# Patient Record
Sex: FEMALE | Race: ASIAN | NOT HISPANIC OR LATINO
[De-identification: names, ages, dates, MRNs, and addresses within clinical notes are randomized per-mention and may not be internally consistent; named-entity substitution may affect disease eponyms.]

---

## 2020-09-08 PROBLEM — Z00.00 ENCOUNTER FOR PREVENTIVE HEALTH EXAMINATION: Status: ACTIVE | Noted: 2020-09-08

## 2020-10-10 DIAGNOSIS — Z01.818 ENCOUNTER FOR OTHER PREPROCEDURAL EXAMINATION: ICD-10-CM

## 2020-10-12 ENCOUNTER — APPOINTMENT (OUTPATIENT)
Dept: DISASTER EMERGENCY | Facility: CLINIC | Age: 65
End: 2020-10-12

## 2020-10-15 ENCOUNTER — APPOINTMENT (OUTPATIENT)
Dept: PULMONOLOGY | Facility: CLINIC | Age: 65
End: 2020-10-15
Payer: MEDICARE

## 2020-10-15 VITALS
SYSTOLIC BLOOD PRESSURE: 148 MMHG | TEMPERATURE: 97.6 F | HEIGHT: 55 IN | WEIGHT: 125 LBS | DIASTOLIC BLOOD PRESSURE: 89 MMHG | OXYGEN SATURATION: 97 % | HEART RATE: 77 BPM | BODY MASS INDEX: 28.93 KG/M2

## 2020-10-15 DIAGNOSIS — Z82.49 FAMILY HISTORY OF ISCHEMIC HEART DISEASE AND OTHER DISEASES OF THE CIRCULATORY SYSTEM: ICD-10-CM

## 2020-10-15 DIAGNOSIS — E78.5 HYPERLIPIDEMIA, UNSPECIFIED: ICD-10-CM

## 2020-10-15 DIAGNOSIS — Z23 ENCOUNTER FOR IMMUNIZATION: ICD-10-CM

## 2020-10-15 DIAGNOSIS — Z82.5 FAMILY HISTORY OF ASTHMA AND OTHER CHRONIC LOWER RESPIRATORY DISEASES: ICD-10-CM

## 2020-10-15 DIAGNOSIS — R73.03 PREDIABETES.: ICD-10-CM

## 2020-10-15 PROCEDURE — G0008: CPT

## 2020-10-15 PROCEDURE — ZZZZZ: CPT

## 2020-10-15 PROCEDURE — 94726 PLETHYSMOGRAPHY LUNG VOLUMES: CPT

## 2020-10-15 PROCEDURE — 90662 IIV NO PRSV INCREASED AG IM: CPT

## 2020-10-15 PROCEDURE — 99204 OFFICE O/P NEW MOD 45 MIN: CPT | Mod: 25

## 2020-10-15 PROCEDURE — 94060 EVALUATION OF WHEEZING: CPT

## 2020-10-15 PROCEDURE — 94729 DIFFUSING CAPACITY: CPT

## 2020-10-15 RX ORDER — ASPIRIN 81 MG/1
81 TABLET, CHEWABLE ORAL
Refills: 0 | Status: ACTIVE | COMMUNITY

## 2020-10-15 RX ORDER — MULTIVITAMIN
TABLET ORAL
Refills: 0 | Status: ACTIVE | COMMUNITY

## 2020-10-15 NOTE — HISTORY OF PRESENT ILLNESS
[TextBox_4] : Ms. Dillard is a 64-year-old female with a history of prediabetes and hyperlipidemia who presents for evaluation of cough and wheezing. Symptoms have been present for several months. She develops dyspnea with exertion with wheezing. She had a cough from July to September, but now cough is resolved. She was started on Symbicort in September, she takes 1 puff twice daily. She also has a ventolin inhaler, which she takes as necessary with reported relief. No postnasal drip or allergies. Reports occasional acid reflux or heartburn, but not regularly. She takes a walk regularly. Exercise tolerance is more than 10 blocks and can walk up several flights of stairs at a normal pace.\par \par Labs in Sept 2020 with peripheral eosinophilia (absolute 435, 6.8%). Also with hyperlipidemia.\par \par CXR PA/Lateral (Sept 2020) with clear lungs, no pneumonia or pleural effusion. No pneumothorax. Cardiac silhouette appears normal. No significant change compared to previous CXR in January 2017.\par \par 2D-echo (June 2020) with normal LV size and systolic function. Mild diastolic dysfunction. Normal RV size and systolic function. LA/RA noraml in size. Minimal MR. Mild TR. No pericardial effusion. Normal pulmonary artery, IVC collapsible\par \par Stress echocardiogram (June 2020) normal study with no evidence of inducible ischemia.\par \par

## 2020-10-15 NOTE — CONSULT LETTER
[Dear  ___] : Dear  [unfilled], [Consult Letter:] : I had the pleasure of evaluating your patient, [unfilled]. [Please see my note below.] : Please see my note below. [Consult Closing:] : Thank you very much for allowing me to participate in the care of this patient.  If you have any questions, please do not hesitate to contact me. [Sincerely,] : Sincerely, [FreeTextEntry2] : Dr. Jensen Nicole MD\par [phone]\par \par Dr. Johnathan Holley MD\par Fax: [phone] [FreeTextEntry3] : William Roque MD\par Attending Physician in Pulmonary & Critical Care Medicine\par  of Medicine\par [employer] [facility]

## 2020-10-15 NOTE — PHYSICAL EXAM
[No Acute Distress] : no acute distress [Well Nourished] : well nourished [Well Groomed] : well groomed [Well Developed] : well developed [Normal Oropharynx] : normal oropharynx [Normal Appearance] : normal appearance [No Neck Mass] : no neck mass [Normal Rate/Rhythm] : normal rate/rhythm [Normal S1, S2] : normal s1, s2 [No Murmurs] : no murmurs [No Resp Distress] : no resp distress [Clear to Auscultation Bilaterally] : clear to auscultation bilaterally [No Abnormalities] : no abnormalities [Benign] : benign [Normal Gait] : normal gait [No Clubbing] : no clubbing [No Cyanosis] : no cyanosis [No Edema] : no edema [FROM] : FROM [Normal Color/ Pigmentation] : normal color/ pigmentation [No Focal Deficits] : no focal deficits [Oriented x3] : oriented x3 [Normal Affect] : normal affect

## 2020-10-15 NOTE — ASSESSMENT
[FreeTextEntry1] : Ms. Dillard is a 64-year-old female with a history of prediabetes and hyperlipidemia who presents for evaluation of cough and wheezing. Symptoms have been present for several months. She develops dyspnea with exertion with wheezing. She had a cough from July to September, but now cough is resolved after starting Symbicort. No associated symptoms of postnasal drip, chest tightness, chest pain, acid reflux, or significant heartburn. Exercise tolerance is not reduced. Of note, labs in Sept 2020 with peripheral eosinophilia (absolute 435, 6.8%)\par \par 1. Dyspnea on exertion:\par - Likely due to mild asthma, possible exercise-induced component\par - PFTs today with mild restriction, low lung volumes, and mildly reduced diffusing capacity\par - Lung are clear to auscultation without crackles or wheezing\par - 2D-echo (June 2020) with normal LV size and systolic function, mild diastolic dysfunction, normal RV size and systolic function, LA/RA normal size, minimal MR, mild TR, no pericardial effusion, normal PA, IVC collapsible\par - Stress echocardiogram (June 2020) normal study with no inducible ischemic changes\par - CXR in September 2020 with clear lungs, she will bring in CD of images next visit\par \par 2. Likely mild persistent asthma:\par - Possible exercise-induced component\par - Increase Symbicort to 80-4.5 mcg 2 puffs twice daily with spacer, rinse after use\par - Ventolin inhaler prn\par - Repeat spirometry with BD testing in December to assess for improvement in FEV1 & FVC\par - If symptoms persist, consider checking IgE, serum allergy testing, and repeat CBC with diff\par \par 3. HCM:\par - Influenza vaccine administered today\par - Will need Prevnar 13 next visit followed by [id number] in 1 year\par - Follow-up in 2 months or sooner prn\par Stress echocardiogram (June 2020) normal study with no evidence of inducible ischemia.\par \par cd of cxr\par

## 2021-04-19 ENCOUNTER — APPOINTMENT (OUTPATIENT)
Dept: PULMONOLOGY | Facility: CLINIC | Age: 66
End: 2021-04-19
Payer: MEDICARE

## 2021-04-19 VITALS
HEART RATE: 71 BPM | BODY MASS INDEX: 29.02 KG/M2 | WEIGHT: 125.38 LBS | DIASTOLIC BLOOD PRESSURE: 82 MMHG | HEIGHT: 55 IN | TEMPERATURE: 97.5 F | SYSTOLIC BLOOD PRESSURE: 121 MMHG | OXYGEN SATURATION: 99 % | RESPIRATION RATE: 15 BRPM

## 2021-04-19 DIAGNOSIS — R06.83 SNORING: ICD-10-CM

## 2021-04-19 PROCEDURE — 99204 OFFICE O/P NEW MOD 45 MIN: CPT | Mod: GC

## 2021-04-19 PROCEDURE — 99072 ADDL SUPL MATRL&STAF TM PHE: CPT

## 2021-04-19 NOTE — HISTORY OF PRESENT ILLNESS
[FreeTextEntry1] : 65F hx pre-DM, HLD, who comes for initial evaluation. Pt was previously seen by Dr. Roque in 10/2020 for  cough, wheezing, FARFAN. Started Symbicort and Ventolin with relief. ETT > 10 blocks though at a slower pace than others and can walk up several flights of stairs at a normal pace. Of note, in 9/2020, she had peripheral eosinophilia. CXR showed clear lungs and Echo in 6/2020 showed mild diastolic dysfunction but otherwise normal LV size/sys function. PFTs with mild restriction pattern.\par \par Today she c/o wheezing, especially at night. States that wheezing has been occurring for the past year and is precipitated by exertion, strong smells. Albuterol helps with wheezing. Sometimes she has wheezing at nighttime that is not precipitated by anything. No coughing, SOB, chest pain, heart palpitations. Remains a stable weight. Currently on symbicort 80mcg bid, which she is mostly compliant with. No history of steroid use. Symptoms have remained persistent and about the same since they started. No hx of asthma as a child, never smoked, no second hand smoking and no wood burning stoves. Her  has also told her that she snores and has witnessed apneic episodes. Has received COVID vaccine. \par \par Meds: Amlodipine 2.5mg, atorvastatin 20 mg \par \par Sleep schedule: 11PM-12AM to bed, sleep latency 30 min, 2 nighttime awakenings - nocturia, no nocturnal choking/gasping, wakes up at 7-7:30AM, refreshed; does not require naps; feels sleepy after lunch but otherwise feels awake/alert during the day; +AM dry mouth, no AM headaches\par  \par ESS: 7\par \par PFTs 10/15/2020: FEV1/FVC 76%, FEV1 65%, FVC 66%, TLC 67%, DLCO 63%\par  [Lower Extremity Discomfort] : no lower extremity discomfort in evening or at bedtime

## 2021-04-19 NOTE — PHYSICAL EXAM
[General Appearance - Well Developed] : well developed [General Appearance - Well Nourished] : well nourished [Low Lying Soft Palate] : low lying soft palate [Enlarged Base of the Tongue] : enlargement of the base of the tongue [IV] : IV [Neck Appearance] : the appearance of the neck was normal [Heart Rate And Rhythm] : heart rate was normal and rhythm regular [Heart Sounds] : normal S1 and S2 [] : no respiratory distress [Auscultation Breath Sounds / Voice Sounds] : lungs were clear to auscultation bilaterally [Abnormal Walk] : normal gait [Motor Tone] : muscle strength and tone were normal [Nail Clubbing] : no clubbing of the fingernails [Cyanosis, Localized] : no localized cyanosis [Skin Color & Pigmentation] : normal skin color and pigmentation [Skin Lesions] : no skin lesions [Motor Exam] : the motor exam was normal [No Focal Deficits] : no focal deficits [Oriented To Time, Place, And Person] : oriented to person, place, and time [Mood] : the mood was normal

## 2021-04-19 NOTE — ASSESSMENT
[FreeTextEntry1] : 65F hx pre-DM, HLD, who comes for initial evaluation. Pt was previously seen by Dr. Roque in 10/2020 for FARFAN, coughing and wheezing. Had PFTs, which showed mild restrictive disease with reduced ERV. Pt no longer having coughing and FARFAN but still has wheezing. Using symbicort and albuterol prn, which has been helpful. Pt likely has small airways disease. Though pt has no EDS (ESS 7),  states that she snores and has witnessed apneic episodes to evaluate for sleep disordered breathing. Will obtain HSAT to r/o sleep apnea.\par \par The patient was referred to the [facility] for diagnostic HSAT for further assessment. The ramifications of obstructive sleep apnea and its potential therapeutic modalities were discussed with the patient. The dangers of drowsy driving were discussed with the patient. The patient was warned to avoid drowsy driving. The patient will followup after results of this study are available.\par

## 2021-04-19 NOTE — REVIEW OF SYSTEMS
July 1, 2019       Viridiana Scott MD  [address] [place] [address]  VIA Facsimile: [phone]      Patient: Patricia Landeros   YOB: 1973   Date of Visit: 7/1/2019       Dear Dr. Scott:    I saw your patient, Patricia Landeros, for an evaluation. Below are my notes for this visit with her.    If you have questions, please do not hesitate to call me.      Sincerely,        Vitor Orourke MD        CC: No Recipients  Vitor Orourke MD  7/1/2019  9:03 AM  Sign at close encounter    HISTORY OF PRESENT ILLNESS:  Patricia Landeros is a 46 year old female whom returns regarding Medial Meniscus Tear; RIGHT Knee and Patellofemoral Osteoarthritis; RIGHT Knee  - pt was told surgery is the next step. Pain is better and swelling is better but still having mechanical issues. Pain is medial. Swelling and pain are stopping her from ADLs and exercise.     Also, s/p Open Patellar Realignment LEFT Knee 1992/93. Patellofemoral Osteoarthritis as well. She is compensating and needs me to look at the LEFT Knee as well. No recent injury.     Accompanied by No One    Occupation/School: Desk Work  PT: Ted Escobar ([employer])    Review of Systems   Constitutional: Negative for chills and fever.   HENT: Negative for congestion, sore throat and tinnitus.    Eyes: Negative for redness.   Respiratory: Negative for cough, shortness of breath, wheezing and stridor.    Cardiovascular: Negative for leg swelling.   Gastrointestinal: Negative for diarrhea, nausea and vomiting.   Endocrine: Negative for cold intolerance and heat intolerance.   Musculoskeletal: Negative for arthralgias, joint swelling and myalgias.   Skin: Negative for color change and pallor.   Neurological: Negative for syncope, weakness and numbness.   Hematological: Does not bruise/bleed easily.   Psychiatric/Behavioral: Negative for agitation, confusion and self-injury.       Past Medial History: Please see intake form    Past Surgical History: Please see intake  form    Family History: Please see intake form    Social History: Please see intact form     MEDICATIONS:  No current outpatient medications on file.     No current facility-administered medications for this visit.         ALLERGIES:  Allergies not on file    PHYSICAL EXAMINATION:    RIGHT Knee:  2+ Effusion. No atrophy. POSITIVE TTP Medial Joint Line. No TTP Lateral Joint Line. No TTP Patella. No TTP Patellar Tendon. No Para-patellar TTP. No Pes Anserine Bursa TTP. No Popliteal Fossa TTP. AROM: 0 - 125. Negative Lachman. Negative Seated Anterior Drawer. Negative Posterior Drawer. Positive Laci's Medial. Negative Laci's Lateral. No Varus Pain or Laxity in Flexion or Extension. No Valgus Pain or Laxity in Flexion or Extension. Calf Soft, NT. NVI distally.    LEFT Knee: Well healed mid-line scar incision. Trace Effusion. No atrophy. Mild TTP Medial Joint Line. No TTP Lateral Joint Line. No TTP Patella. No TTP Patellar Tendon. No Para-patellar TTP. Positive Pes Anserine Bursa TTP. No Popliteal Fossa TTP. AROM: 0 - 120. Negative Lachman. Negative Seated Anterior Drawer. Negative Posterior Drawer. Negative Laci's Medial. Negative Laci's Lateral. No Varus Pain or Laxity in Flexion or Extension. No Valgus Pain or Laxity in Flexion or Extension. Calf Soft, NT. NVI distally.    IMAGING & TESTING:   MRI RIGHT Knee (12/23/18 - REPORT ONLY): Large Radial Tear Posterior Horn Medial Meniscus. Moderate to advanced osteoarthritis... Worse than Patellofemoral compartment. Large Effusion.      Bilateral PA Standing, Bilateral Patellar [place] and Lateral of the LEFT Knee (1/23/17): Normal alignment of the tibiofemoral with marginal osteophyte and degenerative change. Patellofemoral joint laterally tilted with advanced degenerative change, especially lateral facet LEFT > RIGHT. No fracture or dislocation. No evidence of loose body.    ASSESSMENT & PLAN:  Patricia Landeros is a 46 year old female   1) Medial Meniscus Tear;  RIGHT Knee and Patellofemoral Osteoarthritis; RIGHT Knee .   2) Patellofemoral osteoarthritis; LEFT Knee    Plan as follows:  1. Reviewed the Radiographs and MRI findings.  2. LEFT Knee is likely due to compensation for RIGHT  3. RIGHT Knee has persistent mechanical symptoms. Surgery is the next logical step  4. Reviewed the diagnosis and treatment options at this point. Surgery is the next step to consider. Surgery was explained in detail, the procedure, the risks and benefits and the post-operative rehab and recovery. All questions were answered. When ready, the patient will contact my Surgical Scheduler to arrange for RIGHT Knee Arthroscopic Partial Medial Meniscectomy  5. She will schedule surgery as soon as able.      Vitor Orourke MD  06/26/19              [Snoring] : snoring [Witnessed Apneas] : witnessed apnea [Negative] : Psychiatric [EDS: ESS=____] : no daytime somnolence [Fatigue] : no fatigue [Nasal Congestion] : no nasal congestion [Shortness Of Breath] : no shortness of breath [Difficulty Initiating Sleep] : no difficulty falling asleep [Difficulty Maintaining Sleep] : no difficulty maintaining sleep [Lower Extremity Discomfort] : no lower extremity discomfort [Late day/ Evening symptoms] : no late day/evening symptoms [Unusual Sleep Behavior] : no unusual sleep behavior [Hypersomnolence] : not sleeping much more than usual [Cataplexy] :  no cataplexy [Sleep Paralysis] : no sleep paralysis [FreeTextEntry8] : wheezing

## 2021-05-20 ENCOUNTER — APPOINTMENT (OUTPATIENT)
Dept: SLEEP CENTER | Facility: CLINIC | Age: 66
End: 2021-05-20
Payer: MEDICARE

## 2021-05-20 ENCOUNTER — OUTPATIENT (OUTPATIENT)
Dept: OUTPATIENT SERVICES | Facility: HOSPITAL | Age: 66
LOS: 1 days | End: 2021-05-20
Payer: MEDICARE

## 2021-05-20 PROCEDURE — 95806 SLEEP STUDY UNATT&RESP EFFT: CPT | Mod: 26

## 2021-05-20 PROCEDURE — 95806 SLEEP STUDY UNATT&RESP EFFT: CPT

## 2021-05-26 DIAGNOSIS — G47.33 OBSTRUCTIVE SLEEP APNEA (ADULT) (PEDIATRIC): ICD-10-CM

## 2021-06-03 ENCOUNTER — NON-APPOINTMENT (OUTPATIENT)
Age: 66
End: 2021-06-03

## 2021-06-17 ENCOUNTER — NON-APPOINTMENT (OUTPATIENT)
Age: 66
End: 2021-06-17

## 2022-03-23 ENCOUNTER — LABORATORY RESULT (OUTPATIENT)
Age: 67
End: 2022-03-23

## 2022-03-23 ENCOUNTER — APPOINTMENT (OUTPATIENT)
Dept: PULMONOLOGY | Facility: CLINIC | Age: 67
End: 2022-03-23
Payer: MEDICARE

## 2022-03-23 VITALS
OXYGEN SATURATION: 97 % | HEART RATE: 84 BPM | WEIGHT: 134 LBS | BODY MASS INDEX: 31.01 KG/M2 | TEMPERATURE: 98.2 F | RESPIRATION RATE: 16 BRPM | DIASTOLIC BLOOD PRESSURE: 98 MMHG | SYSTOLIC BLOOD PRESSURE: 162 MMHG | HEIGHT: 55 IN

## 2022-03-23 DIAGNOSIS — J06.9 ACUTE UPPER RESPIRATORY INFECTION, UNSPECIFIED: ICD-10-CM

## 2022-03-23 PROCEDURE — 99215 OFFICE O/P EST HI 40 MIN: CPT

## 2022-03-23 RX ORDER — BUDESONIDE AND FORMOTEROL FUMARATE DIHYDRATE 80; 4.5 UG/1; UG/1
80-4.5 AEROSOL RESPIRATORY (INHALATION) TWICE DAILY
Qty: 1 | Refills: 5 | Status: DISCONTINUED | COMMUNITY
Start: 1900-01-01 | End: 2022-03-23

## 2022-03-24 PROBLEM — J06.9 ACUTE URI: Status: RESOLVED | Noted: 2022-03-24 | Resolved: 2022-04-23

## 2022-03-24 LAB
25(OH)D3 SERPL-MCNC: 9.6 NG/ML
ALBUMIN SERPL ELPH-MCNC: 4.7 G/DL
ALP BLD-CCNC: 95 U/L
ALT SERPL-CCNC: 19 U/L
ANION GAP SERPL CALC-SCNC: 14 MMOL/L
AST SERPL-CCNC: 22 U/L
BASOPHILS # BLD AUTO: 0.07 K/UL
BASOPHILS NFR BLD AUTO: 0.3 %
BILIRUB SERPL-MCNC: 0.3 MG/DL
BUN SERPL-MCNC: 9 MG/DL
CALCIUM SERPL-MCNC: 10.1 MG/DL
CHLORIDE SERPL-SCNC: 101 MMOL/L
CO2 SERPL-SCNC: 25 MMOL/L
CREAT SERPL-MCNC: 0.62 MG/DL
EGFR: 97 ML/MIN/1.73M2
EOSINOPHIL # BLD AUTO: 0.2 K/UL
EOSINOPHIL NFR BLD AUTO: 0.9 %
GLUCOSE SERPL-MCNC: 80 MG/DL
HCT VFR BLD CALC: 43.8 %
HGB BLD-MCNC: 13.3 G/DL
IMM GRANULOCYTES NFR BLD AUTO: 0.6 %
LYMPHOCYTES # BLD AUTO: 2.42 K/UL
LYMPHOCYTES NFR BLD AUTO: 10.8 %
MAN DIFF?: NORMAL
MCHC RBC-ENTMCNC: 27.3 PG
MCHC RBC-ENTMCNC: 30.4 GM/DL
MCV RBC AUTO: 89.8 FL
MONOCYTES # BLD AUTO: 1.3 K/UL
MONOCYTES NFR BLD AUTO: 5.8 %
NEUTROPHILS # BLD AUTO: 18.34 K/UL
NEUTROPHILS NFR BLD AUTO: 81.6 %
PLATELET # BLD AUTO: 318 K/UL
POTASSIUM SERPL-SCNC: 4.6 MMOL/L
PROT SERPL-MCNC: 7.5 G/DL
RBC # BLD: 4.88 M/UL
RBC # FLD: 14.6 %
SODIUM SERPL-SCNC: 140 MMOL/L
WBC # FLD AUTO: 22.47 K/UL

## 2022-03-24 NOTE — HISTORY OF PRESENT ILLNESS
[TextBox_4] : Ms. Dillard is a 66-year-old female with a history of asthma, mild GEORGINA (not on therapy), prediabetes and hyperlipidemia who presents with worsening cough and dyspnea for the past 2-3 weeks. She takes Symbicort 80-4.5 mcg 2 puffs twice daily. She has been requiring to take albuterol. She just returned from [place] last week. Reports occasional wheezing. Reports recent 5-10 lb weight gain. She has a cough productive of white phlegm for the past week, now improved with Robitussin, but still present. No fevers or chills. No chest pain. Develops dyspnea going up a flight of stairs. Asthma appears to be triggered by exercise. No postnasal drip or allergies. Reports occasional acid reflux or heartburn, but not regularly. She used to take walks regularly. Exercise tolerance is more than 10 blocks and can walk up several flights of stairs at a normal pace. Labs in Sept 2020 with peripheral eosinophilia (absolute 435, 6.8%). \par \par Regarding her GEORGINA, recent HSAT in May 2021 with NOELLE 11.4, T90 0.7%, consistent with mild GEORGINA. Given ESS<10 with no excessive daytime sleepiness, she does not require therapy. She was referred to dentistry for OAT given her bothersome snoring.\par \par PFTs (Oct 2020) with mild restriction, low lung volumes, and mildly reduced diffusing capacity. Pending repeat PFTs with BD testing\par \par CXR PA/Lateral (Sept 2020) with clear lungs, no pneumonia or pleural effusion. No pneumothorax. Cardiac silhouette appears normal. No significant change compared to previous CXR in January 2017.\par \par 2D-echo (June 2020) with normal LV size and systolic function. Mild diastolic dysfunction. Normal RV size and systolic function. LA/RA noraml in size. Minimal MR. Mild TR. No pericardial effusion. Normal pulmonary artery, IVC collapsible\par \par Stress echocardiogram (June 2020) normal study with no evidence of inducible ischemia.

## 2022-03-24 NOTE — PHYSICAL EXAM
[No Acute Distress] : no acute distress [Well Nourished] : well nourished [Well Groomed] : well groomed [Well Developed] : well developed [Normal Oropharynx] : normal oropharynx [Normal Appearance] : normal appearance [No Neck Mass] : no neck mass [Normal Rate/Rhythm] : normal rate/rhythm [Normal S1, S2] : normal s1, s2 [No Murmurs] : no murmurs [No Resp Distress] : no resp distress [Clear to Auscultation Bilaterally] : clear to auscultation bilaterally [No Abnormalities] : no abnormalities [Benign] : benign [Normal Gait] : normal gait [No Clubbing] : no clubbing [No Cyanosis] : no cyanosis [No Edema] : no edema [FROM] : FROM [Normal Color/ Pigmentation] : normal color/ pigmentation [No Focal Deficits] : no focal deficits [Oriented x3] : oriented x3 [Normal Affect] : normal affect [Supple] : supple [No JVD] : no jvd [Normal Pulses] : normal pulses [Not Tender] : not tender [Soft] : soft [Cranial Nerves Intact] : cranial nerves intact [No Motor Deficits] : no motor deficits [TextBox_54] : trace bilateral lower extremity edema [TextBox_68] : no wheezing or rales [TextBox_89] : m

## 2022-03-24 NOTE — ASSESSMENT
[FreeTextEntry1] : Ms. Dillard is a 66-year-old female with a history of asthma, mild GEORGINA (not on therapy), prediabetes and hyperlipidemia who presents with worsening cough and dyspnea for the past 2-3 weeks. She takes Symbicort 80-4.5 mcg 2 puffs twice daily. She has been requiring to take albuterol. She just returned from [place] last week. Reports occasional wheezing. No fevers or chills. Cough is productive of white phlegm, somewhat improved with Robitussin. No chest pain, but has had increased dyspnea in the last 2 weeks.No associated symptoms of postnasal drip, chest tightness, chest pain, acid reflux, or significant heartburn. Exercise tolerance is not reduced. Previous labs in Sept 2020 with peripheral eosinophilia (absolute 435, 6.8%)\par \par 1. Productive cough and dyspnea for 2 weeks worrisome for bacterial URI vs. CAP:\par - Check CXR PA/Lateral\par - CBC today with significant neutrophilic leukocytosis (WBC 22, 80% neutrophils). Repeat CBC in 1 week\par - Check sputum culture (patient will come in 3/25 AM)\par - Start Augmentin 875 mg bid for 7 days + Z-deloris for 5 days\par - She has trace bilateral lower extremity edema. Symptoms not consistent with DVT/PE. No tachycardia or hypoxemia However, given recent travel, will check lower extremity venous duplex r/o VTE\par \par 2. Uncontrolled asthma:\par - CBC today without significant eosinophilia (absolute eos 200)\par - Follow-up IgE and upper respiratory panel\par - Increase Symbicort to 160-4.5 mcg 2 puffs twice daily, rinse after use\par - Ventolin inhaler prn\par - Repeat complete PFTs with bronchodilator testing\par - Last PFTs in Oct 2020 with mild restriction, no BD response, low lung volumes, and mildly reduced diffusing capacity\par \par 3. Dyspnea on exertion:\par - Likely due to mild asthma, possible exercise-induced component. Also with recent weight gain and some deconditioning due to relative inactivity\par - PFTs Oct 2020 with mild restriction, low lung volumes, and mildly reduced diffusing capacity. Repeat now\par - Lung are clear to auscultation without crackles or wheezing\par - 2D-echo (June 2020) with normal LV size and systolic function, mild diastolic dysfunction, normal RV size and systolic function, LA/RA normal size, minimal MR, mild TR, no pericardial effusion, normal PA, IVC collapsible. Will provide me of recent echo performed with cardiology\par - Stress echocardiogram (June 2020) normal study with no inducible ischemic changes\par - CXR in September 2020 with clear lungs, repeat now\par - Recommend increased physical activity as tolerates, at least 30 minutes of brisk walking daily\par \par 4. Vitamin D deficiency:\par - 25-OH vitamin D level low at 9.6\par - Start vitamin d 50,000 IU weekly for 8 weeks, then 1000 IU daily\par \par 5. HCM:\par - Up to date with Influenza, Pneumococcal, and COVID-19 vaccinations\par - Follow-up in 1-2 months or sooner prn\par - Discussed with patient and  at bedside and son Dr. Kenyon Dillard by phone

## 2022-03-25 ENCOUNTER — OUTPATIENT (OUTPATIENT)
Dept: OUTPATIENT SERVICES | Facility: HOSPITAL | Age: 67
LOS: 1 days | End: 2022-03-25
Payer: MEDICARE

## 2022-03-25 ENCOUNTER — APPOINTMENT (OUTPATIENT)
Dept: RADIOLOGY | Facility: CLINIC | Age: 67
End: 2022-03-25
Payer: MEDICARE

## 2022-03-25 ENCOUNTER — APPOINTMENT (OUTPATIENT)
Dept: ULTRASOUND IMAGING | Facility: CLINIC | Age: 67
End: 2022-03-25
Payer: MEDICARE

## 2022-03-25 DIAGNOSIS — R06.00 DYSPNEA, UNSPECIFIED: ICD-10-CM

## 2022-03-25 DIAGNOSIS — J45.30 MILD PERSISTENT ASTHMA, UNCOMPLICATED: ICD-10-CM

## 2022-03-25 PROCEDURE — 71046 X-RAY EXAM CHEST 2 VIEWS: CPT | Mod: 26

## 2022-03-25 PROCEDURE — 71046 X-RAY EXAM CHEST 2 VIEWS: CPT

## 2022-03-25 PROCEDURE — 93970 EXTREMITY STUDY: CPT | Mod: 26

## 2022-03-25 PROCEDURE — 93970 EXTREMITY STUDY: CPT

## 2022-03-28 ENCOUNTER — NON-APPOINTMENT (OUTPATIENT)
Age: 67
End: 2022-03-28

## 2022-03-28 ENCOUNTER — TRANSCRIPTION ENCOUNTER (OUTPATIENT)
Age: 67
End: 2022-03-28

## 2022-03-28 LAB
A ALTERNATA IGE QN: <0.1 KUA/L
A FUMIGATUS IGE QN: <0.1 KUA/L
BACTERIA SPT CULT: ABNORMAL
BERMUDA GRASS IGE QN: <0.1 KUA/L
BOXELDER IGE QN: <0.1 KUA/L
C HERBARUM IGE QN: <0.1 KUA/L
CALIF WALNUT IGE QN: <0.1 KUA/L
CAT DANDER IGE QN: <0.1 KUA/L
CMN PIGWEED IGE QN: <0.1 KUA/L
COMMON RAGWEED IGE QN: <0.1 KUA/L
COTTONWOOD IGE QN: <0.1 KUA/L
D FARINAE IGE QN: <0.1 KUA/L
D PTERONYSS IGE QN: <0.1 KUA/L
DEPRECATED A ALTERNATA IGE RAST QL: 0
DEPRECATED A FUMIGATUS IGE RAST QL: 0
DEPRECATED BERMUDA GRASS IGE RAST QL: 0
DEPRECATED BOXELDER IGE RAST QL: 0
DEPRECATED C HERBARUM IGE RAST QL: 0
DEPRECATED CAT DANDER IGE RAST QL: 0
DEPRECATED COMMON PIGWEED IGE RAST QL: 0
DEPRECATED COMMON RAGWEED IGE RAST QL: 0
DEPRECATED COTTONWOOD IGE RAST QL: 0
DEPRECATED D FARINAE IGE RAST QL: 0
DEPRECATED D PTERONYSS IGE RAST QL: 0
DEPRECATED DOG DANDER IGE RAST QL: 0
DEPRECATED GOOSEFOOT IGE RAST QL: 0
DEPRECATED LONDON PLANE IGE RAST QL: 0
DEPRECATED MOUSE URINE PROT IGE RAST QL: 0
DEPRECATED MUGWORT IGE RAST QL: 0
DEPRECATED P NOTATUM IGE RAST QL: 0
DEPRECATED RED CEDAR IGE RAST QL: 0
DEPRECATED ROACH IGE RAST QL: 0
DEPRECATED SHEEP SORREL IGE RAST QL: 0
DEPRECATED SILVER BIRCH IGE RAST QL: 0
DEPRECATED TIMOTHY IGE RAST QL: 0
DEPRECATED WHITE ASH IGE RAST QL: 0
DEPRECATED WHITE OAK IGE RAST QL: 0
DOG DANDER IGE QN: <0.1 KUA/L
GOOSEFOOT IGE QN: <0.1 KUA/L
LONDON PLANE IGE QN: <0.1 KUA/L
MOUSE URINE PROT IGE QN: <0.1 KUA/L
MUGWORT IGE QN: <0.1 KUA/L
MULBERRY (T70) CLASS: 0
MULBERRY (T70) CONC: <0.1 KUA/L
P NOTATUM IGE QN: <0.1 KUA/L
RED CEDAR IGE QN: <0.1 KUA/L
ROACH IGE QN: <0.1 KUA/L
SHEEP SORREL IGE QN: <0.1 KUA/L
SILVER BIRCH IGE QN: <0.1 KUA/L
TIMOTHY IGE QN: <0.1 KUA/L
TOTAL IGE SMQN RAST: 333 KU/L
TREE ALLERG MIX1 IGE QL: 0
WHITE ASH IGE QN: <0.1 KUA/L
WHITE ELM IGE QN: 0
WHITE ELM IGE QN: <0.1 KUA/L
WHITE OAK IGE QN: <0.1 KUA/L

## 2022-03-29 ENCOUNTER — APPOINTMENT (OUTPATIENT)
Dept: PULMONOLOGY | Facility: CLINIC | Age: 67
End: 2022-03-29
Payer: MEDICARE

## 2022-03-29 PROCEDURE — 36415 COLL VENOUS BLD VENIPUNCTURE: CPT

## 2022-03-30 ENCOUNTER — NON-APPOINTMENT (OUTPATIENT)
Age: 67
End: 2022-03-30

## 2022-03-30 ENCOUNTER — EMERGENCY (EMERGENCY)
Facility: HOSPITAL | Age: 67
LOS: 1 days | Discharge: HOME | End: 2022-03-30
Attending: EMERGENCY MEDICINE | Admitting: EMERGENCY MEDICINE
Payer: MEDICARE

## 2022-03-30 ENCOUNTER — APPOINTMENT (OUTPATIENT)
Dept: OTOLARYNGOLOGY | Facility: CLINIC | Age: 67
End: 2022-03-30
Payer: MEDICARE

## 2022-03-30 VITALS
WEIGHT: 134.92 LBS | SYSTOLIC BLOOD PRESSURE: 132 MMHG | RESPIRATION RATE: 18 BRPM | DIASTOLIC BLOOD PRESSURE: 67 MMHG | HEART RATE: 105 BPM | OXYGEN SATURATION: 98 % | TEMPERATURE: 99 F

## 2022-03-30 VITALS
SYSTOLIC BLOOD PRESSURE: 128 MMHG | HEIGHT: 55 IN | HEART RATE: 96 BPM | WEIGHT: 134 LBS | BODY MASS INDEX: 31.01 KG/M2 | TEMPERATURE: 97.6 F | DIASTOLIC BLOOD PRESSURE: 85 MMHG

## 2022-03-30 DIAGNOSIS — H90.3 SENSORINEURAL HEARING LOSS, BILATERAL: ICD-10-CM

## 2022-03-30 DIAGNOSIS — J31.0 CHRONIC RHINITIS: ICD-10-CM

## 2022-03-30 DIAGNOSIS — H61.23 IMPACTED CERUMEN, BILATERAL: ICD-10-CM

## 2022-03-30 DIAGNOSIS — J34.2 DEVIATED NASAL SEPTUM: ICD-10-CM

## 2022-03-30 DIAGNOSIS — H69.83 OTHER SPECIFIED DISORDERS OF EUSTACHIAN TUBE, BILATERAL: ICD-10-CM

## 2022-03-30 DIAGNOSIS — H92.03 OTALGIA, BILATERAL: ICD-10-CM

## 2022-03-30 LAB
ALBUMIN SERPL ELPH-MCNC: 3.5 G/DL — SIGNIFICANT CHANGE UP (ref 3.3–5)
ALP SERPL-CCNC: 74 U/L — SIGNIFICANT CHANGE UP (ref 40–120)
ALT FLD-CCNC: 24 U/L — SIGNIFICANT CHANGE UP (ref 12–78)
ANION GAP SERPL CALC-SCNC: 8 MMOL/L — SIGNIFICANT CHANGE UP (ref 5–17)
APPEARANCE UR: CLEAR — SIGNIFICANT CHANGE UP
APTT BLD: 27.5 SEC — SIGNIFICANT CHANGE UP (ref 27.5–35.5)
AST SERPL-CCNC: 16 U/L — SIGNIFICANT CHANGE UP (ref 15–37)
BASOPHILS # BLD AUTO: 0.07 K/UL — SIGNIFICANT CHANGE UP (ref 0–0.2)
BASOPHILS # BLD AUTO: 0.08 K/UL
BASOPHILS NFR BLD AUTO: 0.9 % — SIGNIFICANT CHANGE UP (ref 0–2)
BASOPHILS NFR BLD AUTO: 1 %
BILIRUB SERPL-MCNC: 0.6 MG/DL — SIGNIFICANT CHANGE UP (ref 0.2–1.2)
BILIRUB UR-MCNC: NEGATIVE — SIGNIFICANT CHANGE UP
BUN SERPL-MCNC: 9 MG/DL — SIGNIFICANT CHANGE UP (ref 7–23)
CALCIUM SERPL-MCNC: 9.4 MG/DL — SIGNIFICANT CHANGE UP (ref 8.5–10.1)
CHLORIDE SERPL-SCNC: 101 MMOL/L — SIGNIFICANT CHANGE UP (ref 96–108)
CO2 SERPL-SCNC: 26 MMOL/L — SIGNIFICANT CHANGE UP (ref 22–31)
COLOR SPEC: SIGNIFICANT CHANGE UP
CREAT SERPL-MCNC: 0.8 MG/DL — SIGNIFICANT CHANGE UP (ref 0.5–1.3)
DIFF PNL FLD: ABNORMAL
EGFR: 80 ML/MIN/1.73M2 — SIGNIFICANT CHANGE UP
EOSINOPHIL # BLD AUTO: 0.15 K/UL — SIGNIFICANT CHANGE UP (ref 0–0.5)
EOSINOPHIL # BLD AUTO: 0.21 K/UL
EOSINOPHIL NFR BLD AUTO: 1.9 % — SIGNIFICANT CHANGE UP (ref 0–6)
EOSINOPHIL NFR BLD AUTO: 2.6 %
GLUCOSE SERPL-MCNC: 143 MG/DL — HIGH (ref 70–99)
GLUCOSE UR QL: NEGATIVE — SIGNIFICANT CHANGE UP
HCT VFR BLD CALC: 42.4 % — SIGNIFICANT CHANGE UP (ref 34.5–45)
HCT VFR BLD CALC: 46.4 %
HGB BLD-MCNC: 13.8 G/DL — SIGNIFICANT CHANGE UP (ref 11.5–15.5)
HGB BLD-MCNC: 14.5 G/DL
IMM GRANULOCYTES NFR BLD AUTO: 0.3 % — SIGNIFICANT CHANGE UP (ref 0–1.5)
IMM GRANULOCYTES NFR BLD AUTO: 0.4 %
INR BLD: 1.15 RATIO — SIGNIFICANT CHANGE UP (ref 0.88–1.16)
KETONES UR-MCNC: NEGATIVE — SIGNIFICANT CHANGE UP
LACTATE SERPL-SCNC: 1.8 MMOL/L — SIGNIFICANT CHANGE UP (ref 0.7–2)
LEUKOCYTE ESTERASE UR-ACNC: ABNORMAL
LYMPHOCYTES # BLD AUTO: 2.11 K/UL
LYMPHOCYTES # BLD AUTO: 2.4 K/UL — SIGNIFICANT CHANGE UP (ref 1–3.3)
LYMPHOCYTES # BLD AUTO: 31.2 % — SIGNIFICANT CHANGE UP (ref 13–44)
LYMPHOCYTES NFR BLD AUTO: 25.6 %
MAN DIFF?: NORMAL
MCHC RBC-ENTMCNC: 26.4 PG
MCHC RBC-ENTMCNC: 26.8 PG — LOW (ref 27–34)
MCHC RBC-ENTMCNC: 31.3 GM/DL
MCHC RBC-ENTMCNC: 32.5 GM/DL — SIGNIFICANT CHANGE UP (ref 32–36)
MCV RBC AUTO: 82.3 FL — SIGNIFICANT CHANGE UP (ref 80–100)
MCV RBC AUTO: 84.4 FL
MONOCYTES # BLD AUTO: 0.69 K/UL — SIGNIFICANT CHANGE UP (ref 0–0.9)
MONOCYTES # BLD AUTO: 0.7 K/UL
MONOCYTES NFR BLD AUTO: 8.5 %
MONOCYTES NFR BLD AUTO: 9 % — SIGNIFICANT CHANGE UP (ref 2–14)
NEUTROPHILS # BLD AUTO: 4.37 K/UL — SIGNIFICANT CHANGE UP (ref 1.8–7.4)
NEUTROPHILS # BLD AUTO: 5.1 K/UL
NEUTROPHILS NFR BLD AUTO: 56.7 % — SIGNIFICANT CHANGE UP (ref 43–77)
NEUTROPHILS NFR BLD AUTO: 61.9 %
NITRITE UR-MCNC: NEGATIVE — SIGNIFICANT CHANGE UP
NRBC # BLD: 0 /100 WBCS — SIGNIFICANT CHANGE UP (ref 0–0)
PH UR: 6 — SIGNIFICANT CHANGE UP (ref 5–8)
PLATELET # BLD AUTO: 290 K/UL — SIGNIFICANT CHANGE UP (ref 150–400)
PLATELET # BLD AUTO: 341 K/UL
POTASSIUM SERPL-MCNC: 3.4 MMOL/L — LOW (ref 3.5–5.3)
POTASSIUM SERPL-SCNC: 3.4 MMOL/L — LOW (ref 3.5–5.3)
PROT SERPL-MCNC: 7.3 G/DL — SIGNIFICANT CHANGE UP (ref 6–8.3)
PROT UR-MCNC: 15
PROTHROM AB SERPL-ACNC: 13.5 SEC — HIGH (ref 10.5–13.4)
RAPID RVP RESULT: SIGNIFICANT CHANGE UP
RBC # BLD: 5.15 M/UL — SIGNIFICANT CHANGE UP (ref 3.8–5.2)
RBC # BLD: 5.5 M/UL
RBC # FLD: 14.5 % — SIGNIFICANT CHANGE UP (ref 10.3–14.5)
RBC # FLD: 14.6 %
SARS-COV-2 RNA SPEC QL NAA+PROBE: SIGNIFICANT CHANGE UP
SODIUM SERPL-SCNC: 135 MMOL/L — SIGNIFICANT CHANGE UP (ref 135–145)
SP GR SPEC: 1.01 — SIGNIFICANT CHANGE UP (ref 1.01–1.02)
UROBILINOGEN FLD QL: NEGATIVE — SIGNIFICANT CHANGE UP
WBC # BLD: 7.7 K/UL — SIGNIFICANT CHANGE UP (ref 3.8–10.5)
WBC # FLD AUTO: 7.7 K/UL — SIGNIFICANT CHANGE UP (ref 3.8–10.5)
WBC # FLD AUTO: 8.23 K/UL

## 2022-03-30 PROCEDURE — 80053 COMPREHEN METABOLIC PANEL: CPT

## 2022-03-30 PROCEDURE — 87086 URINE CULTURE/COLONY COUNT: CPT

## 2022-03-30 PROCEDURE — 92567 TYMPANOMETRY: CPT

## 2022-03-30 PROCEDURE — G0268 REMOVAL OF IMPACTED WAX MD: CPT

## 2022-03-30 PROCEDURE — 99283 EMERGENCY DEPT VISIT LOW MDM: CPT

## 2022-03-30 PROCEDURE — 85610 PROTHROMBIN TIME: CPT

## 2022-03-30 PROCEDURE — 85025 COMPLETE CBC W/AUTO DIFF WBC: CPT

## 2022-03-30 PROCEDURE — 0225U NFCT DS DNA&RNA 21 SARSCOV2: CPT

## 2022-03-30 PROCEDURE — 87040 BLOOD CULTURE FOR BACTERIA: CPT

## 2022-03-30 PROCEDURE — 81001 URINALYSIS AUTO W/SCOPE: CPT

## 2022-03-30 PROCEDURE — 99285 EMERGENCY DEPT VISIT HI MDM: CPT

## 2022-03-30 PROCEDURE — 83605 ASSAY OF LACTIC ACID: CPT

## 2022-03-30 PROCEDURE — 93005 ELECTROCARDIOGRAM TRACING: CPT

## 2022-03-30 PROCEDURE — 85730 THROMBOPLASTIN TIME PARTIAL: CPT

## 2022-03-30 PROCEDURE — 99204 OFFICE O/P NEW MOD 45 MIN: CPT | Mod: 25

## 2022-03-30 PROCEDURE — 36415 COLL VENOUS BLD VENIPUNCTURE: CPT

## 2022-03-30 PROCEDURE — 92557 COMPREHENSIVE HEARING TEST: CPT

## 2022-03-30 PROCEDURE — 93010 ELECTROCARDIOGRAM REPORT: CPT

## 2022-03-30 RX ORDER — SODIUM CHLORIDE 9 MG/ML
1000 INJECTION INTRAMUSCULAR; INTRAVENOUS; SUBCUTANEOUS ONCE
Refills: 0 | Status: COMPLETED | OUTPATIENT
Start: 2022-03-30 | End: 2022-03-30

## 2022-03-30 RX ADMIN — SODIUM CHLORIDE 1000 MILLILITER(S): 9 INJECTION INTRAMUSCULAR; INTRAVENOUS; SUBCUTANEOUS at 12:35

## 2022-03-30 NOTE — ED PROVIDER NOTE - IV ALTEPLASE INCLUSION HIDDEN
Nadine Obrien / Nola Wong called about 14:45 - chart was faxed to [facility] for review  Nadine Obrien / John Kurtz called about 17:40 - pt accepted to [facility] by Dr Martinez House; Vic Muir couldn't pick-up until after 11p; Lucita scheduled for 20:00   ER; pt; Nadine Obrien and [facility] all updated  show

## 2022-03-30 NOTE — DATA REVIEWED
[de-identified] : Hearing Test performed to evaluate the extent of hearing loss and  to explain pt's symptoms\par today's hearing test was personally reviewed and revealed\par WNL to mild SNHL AU

## 2022-03-30 NOTE — ED PROVIDER NOTE - PHYSICAL EXAMINATION
Gen: alert, NAD  HEENT:  NC/AT, PERR, no exophthalmos  CV:   well perfused, rrr   Pulm: CTA b/l, normal RR, I/E ratio and chest excursion  Abd: s/nt/nd  MSK: moving all extremities  Neuro:  non-focal  Skin:  visualized areas intact  Psych: AOx3

## 2022-03-30 NOTE — ED PROVIDER NOTE - NSICDXPASTMEDICALHX_GEN_ALL_CORE_FT
PAST MEDICAL HISTORY:  Asthmatic bronchitis , chronic     HTN (hypertension)     Hyperlipidemia     Mild obstructive sleep apnea

## 2022-03-30 NOTE — HISTORY OF PRESENT ILLNESS
[de-identified] : Patient complains of bilateral ear pain since this morning. States its intermittent pain, she is not in pain right now. Describes as a dull aching pain. She is currently taking abx of a cold. Pt has no ear drainage, hearing loss, tinnitus, vertigo, nasal congestion, nasal discharge, epistaxis, sinus infections, facial pain, facial pressure, throat pain, dysphagia or fevers\par \par

## 2022-03-30 NOTE — ED ADULT NURSE NOTE - OBJECTIVE STATEMENT
a/ox4 patient came to ED for weakness and shortness of breath that began last week. Patient was positive for the flu last week and had elevated WBC on routine blood work. Afebrile. no N/V/D. No cp, dizziness. Pending further labs and radiology reports.

## 2022-03-30 NOTE — ED PROVIDER NOTE - OBJECTIVE STATEMENT
pt states that she has not been feeling well for the last several days, over the weekend she had a migraine headache with photophobia and vomiting that is typical of her migraines which has now resolved, also been having a cough, malaise and poor appetite for over 1 week, outpatient cxr was neg but sputum culture was positive for H. Influenza and pt was started on z-pack and augmentin after which time pt developed some diarrhea. pt s/p recent trip to [place] and returned about 2 weeks ago.  pt denies any fevers or bodyaches, just feels low energy, poor appetite and non-productive cough.

## 2022-03-30 NOTE — ED PROVIDER NOTE - PATIENT PORTAL LINK FT
You can access the FollowMyHealth Patient Portal offered by [facility] by registering at the following website: [email or web address]. By joining [employer]’s FollowMyHealth portal, you will also be able to view your health information using other applications (apps) compatible with our system.

## 2022-03-30 NOTE — ASSESSMENT
[FreeTextEntry1] : Ms. COBURN 66 year F complains of bilateral ear pain since this morning. Currently on abx for a cold \par \par Otalgia Bilateral- r/o ETD\par -Tymps Test performed \par -Rx: Flonase \par \par Wax\par -Cerumen is removed from the right and left  ear canal with a curette and suction.\par -Rx:Debrox be placed in both ears on a routine basis to keep them free of wax.\par -Routine debridement suggested to keep the ears free of wax.\par \par f/u prn

## 2022-03-30 NOTE — END OF VISIT
[FreeTextEntry3] : I personally saw and examined TED COBURN in detail. I spoke to OLIVIA Funk regarding the assessment and plan of care. I reviewed the above assessment and plan of care, and agree. I have made changes in changes in the body of the note where appropriate.I personally reviewed the HPI, PMH, FH, SH, ROS and medications/allergies. I have spoken to OLIVIA Funk regarding the history and have personally determined the assessment and plan of care, and documented this myself. I was present and participated in all key portions of the encounter and all procedures noted above. I have made changes in the body of the note where appropriate.\par \par Attesting Faculty: See Attending Signature Below \par \par \par  [Time Spent: ___ minutes] : I have spent [unfilled] minutes of time on the encounter.

## 2022-03-30 NOTE — ED PROVIDER NOTE - NSFOLLOWUPINSTRUCTIONS_ED_ALL_ED_FT
-- Follow up with your primary care physician in 48 hours.    -- Return to the ER for worsening or persistent symptoms, and/or ANY NEW OR CONCERNING SYMPTOMS.    -- If you have difficulty following up, please call: [phone] to obtain a [facility] doctor or specialist who takes your insurance in your area.

## 2022-03-31 LAB
CULTURE RESULTS: SIGNIFICANT CHANGE UP
SPECIMEN SOURCE: SIGNIFICANT CHANGE UP

## 2022-04-04 ENCOUNTER — TRANSCRIPTION ENCOUNTER (OUTPATIENT)
Age: 67
End: 2022-04-04

## 2022-04-04 ENCOUNTER — INPATIENT (INPATIENT)
Facility: HOSPITAL | Age: 67
LOS: 0 days | Discharge: HOME | DRG: 641 | End: 2022-04-05
Attending: INTERNAL MEDICINE | Admitting: INTERNAL MEDICINE
Payer: MEDICARE

## 2022-04-04 VITALS
HEART RATE: 105 BPM | TEMPERATURE: 99 F | WEIGHT: 130.07 LBS | SYSTOLIC BLOOD PRESSURE: 121 MMHG | OXYGEN SATURATION: 98 % | DIASTOLIC BLOOD PRESSURE: 82 MMHG | RESPIRATION RATE: 17 BRPM

## 2022-04-04 DIAGNOSIS — R42 DIZZINESS AND GIDDINESS: ICD-10-CM

## 2022-04-04 DIAGNOSIS — R00.0 TACHYCARDIA, UNSPECIFIED: ICD-10-CM

## 2022-04-04 DIAGNOSIS — R53.1 WEAKNESS: ICD-10-CM

## 2022-04-04 DIAGNOSIS — I10 ESSENTIAL (PRIMARY) HYPERTENSION: ICD-10-CM

## 2022-04-04 DIAGNOSIS — Z29.9 ENCOUNTER FOR PROPHYLACTIC MEASURES, UNSPECIFIED: ICD-10-CM

## 2022-04-04 DIAGNOSIS — J45.909 UNSPECIFIED ASTHMA, UNCOMPLICATED: ICD-10-CM

## 2022-04-04 DIAGNOSIS — E78.5 HYPERLIPIDEMIA, UNSPECIFIED: ICD-10-CM

## 2022-04-04 DIAGNOSIS — R19.7 DIARRHEA, UNSPECIFIED: ICD-10-CM

## 2022-04-04 PROBLEM — G47.33 OBSTRUCTIVE SLEEP APNEA (ADULT) (PEDIATRIC): Chronic | Status: ACTIVE | Noted: 2022-03-30

## 2022-04-04 PROBLEM — J44.9 CHRONIC OBSTRUCTIVE PULMONARY DISEASE, UNSPECIFIED: Chronic | Status: ACTIVE | Noted: 2022-03-30

## 2022-04-04 LAB
ALBUMIN SERPL ELPH-MCNC: 3.6 G/DL — SIGNIFICANT CHANGE UP (ref 3.3–5)
ALP SERPL-CCNC: 71 U/L — SIGNIFICANT CHANGE UP (ref 40–120)
ALT FLD-CCNC: 23 U/L — SIGNIFICANT CHANGE UP (ref 12–78)
AMMONIA BLD-MCNC: 18 UMOL/L — SIGNIFICANT CHANGE UP (ref 11–32)
ANION GAP SERPL CALC-SCNC: 7 MMOL/L — SIGNIFICANT CHANGE UP (ref 5–17)
APPEARANCE UR: CLEAR — SIGNIFICANT CHANGE UP
AST SERPL-CCNC: 8 U/L — LOW (ref 15–37)
BASOPHILS # BLD AUTO: 0.08 K/UL — SIGNIFICANT CHANGE UP (ref 0–0.2)
BASOPHILS NFR BLD AUTO: 1.1 % — SIGNIFICANT CHANGE UP (ref 0–2)
BILIRUB SERPL-MCNC: 0.4 MG/DL — SIGNIFICANT CHANGE UP (ref 0.2–1.2)
BILIRUB UR-MCNC: NEGATIVE — SIGNIFICANT CHANGE UP
BUN SERPL-MCNC: 7 MG/DL — SIGNIFICANT CHANGE UP (ref 7–23)
CALCIUM SERPL-MCNC: 9.4 MG/DL — SIGNIFICANT CHANGE UP (ref 8.5–10.1)
CHLORIDE SERPL-SCNC: 101 MMOL/L — SIGNIFICANT CHANGE UP (ref 96–108)
CK SERPL-CCNC: 38 U/L — SIGNIFICANT CHANGE UP (ref 26–192)
CO2 SERPL-SCNC: 27 MMOL/L — SIGNIFICANT CHANGE UP (ref 22–31)
COLOR SPEC: SIGNIFICANT CHANGE UP
CREAT SERPL-MCNC: 0.67 MG/DL — SIGNIFICANT CHANGE UP (ref 0.5–1.3)
CULTURE RESULTS: SIGNIFICANT CHANGE UP
CULTURE RESULTS: SIGNIFICANT CHANGE UP
D DIMER BLD IA.RAPID-MCNC: <150 NG/ML DDU — SIGNIFICANT CHANGE UP
DIFF PNL FLD: NEGATIVE — SIGNIFICANT CHANGE UP
EGFR: 95 ML/MIN/1.73M2 — SIGNIFICANT CHANGE UP
EOSINOPHIL # BLD AUTO: 0.22 K/UL — SIGNIFICANT CHANGE UP (ref 0–0.5)
EOSINOPHIL NFR BLD AUTO: 3.1 % — SIGNIFICANT CHANGE UP (ref 0–6)
GLUCOSE SERPL-MCNC: 109 MG/DL — HIGH (ref 70–99)
GLUCOSE UR QL: NEGATIVE — SIGNIFICANT CHANGE UP
HCT VFR BLD CALC: 39.5 % — SIGNIFICANT CHANGE UP (ref 34.5–45)
HCT VFR BLD CALC: 41.3 % — SIGNIFICANT CHANGE UP (ref 34.5–45)
HGB BLD-MCNC: 13 G/DL — SIGNIFICANT CHANGE UP (ref 11.5–15.5)
IMM GRANULOCYTES NFR BLD AUTO: 0.1 % — SIGNIFICANT CHANGE UP (ref 0–1.5)
KETONES UR-MCNC: NEGATIVE — SIGNIFICANT CHANGE UP
LEUKOCYTE ESTERASE UR-ACNC: NEGATIVE — SIGNIFICANT CHANGE UP
LYMPHOCYTES # BLD AUTO: 2.26 K/UL — SIGNIFICANT CHANGE UP (ref 1–3.3)
LYMPHOCYTES # BLD AUTO: 31.4 % — SIGNIFICANT CHANGE UP (ref 13–44)
MCHC RBC-ENTMCNC: 27 PG — SIGNIFICANT CHANGE UP (ref 27–34)
MCHC RBC-ENTMCNC: 32.9 GM/DL — SIGNIFICANT CHANGE UP (ref 32–36)
MCV RBC AUTO: 82 FL — SIGNIFICANT CHANGE UP (ref 80–100)
MONOCYTES # BLD AUTO: 0.66 K/UL — SIGNIFICANT CHANGE UP (ref 0–0.9)
MONOCYTES NFR BLD AUTO: 9.2 % — SIGNIFICANT CHANGE UP (ref 2–14)
NEUTROPHILS # BLD AUTO: 3.96 K/UL — SIGNIFICANT CHANGE UP (ref 1.8–7.4)
NEUTROPHILS NFR BLD AUTO: 55.1 % — SIGNIFICANT CHANGE UP (ref 43–77)
NITRITE UR-MCNC: NEGATIVE — SIGNIFICANT CHANGE UP
NRBC # BLD: 0 /100 WBCS — SIGNIFICANT CHANGE UP (ref 0–0)
PH UR: 6.5 — SIGNIFICANT CHANGE UP (ref 5–8)
PLATELET # BLD AUTO: 303 K/UL — SIGNIFICANT CHANGE UP (ref 150–400)
POTASSIUM SERPL-MCNC: 4.3 MMOL/L — SIGNIFICANT CHANGE UP (ref 3.5–5.3)
POTASSIUM SERPL-SCNC: 4.3 MMOL/L — SIGNIFICANT CHANGE UP (ref 3.5–5.3)
PROT SERPL-MCNC: 6.9 G/DL — SIGNIFICANT CHANGE UP (ref 6–8.3)
PROT UR-MCNC: NEGATIVE — SIGNIFICANT CHANGE UP
RAPID RVP RESULT: SIGNIFICANT CHANGE UP
RBC # BLD: 4.82 M/UL — SIGNIFICANT CHANGE UP (ref 3.8–5.2)
RBC # FLD: 14.5 % — SIGNIFICANT CHANGE UP (ref 10.3–14.5)
SARS-COV-2 RNA SPEC QL NAA+PROBE: SIGNIFICANT CHANGE UP
SODIUM SERPL-SCNC: 135 MMOL/L — SIGNIFICANT CHANGE UP (ref 135–145)
SP GR SPEC: 1 — LOW (ref 1.01–1.02)
SPECIMEN SOURCE: SIGNIFICANT CHANGE UP
SPECIMEN SOURCE: SIGNIFICANT CHANGE UP
TSH SERPL-MCNC: 0.76 UIU/ML — SIGNIFICANT CHANGE UP (ref 0.36–3.74)
TSH SERPL-MCNC: 0.93 UIU/ML — SIGNIFICANT CHANGE UP (ref 0.36–3.74)
UROBILINOGEN FLD QL: NEGATIVE — SIGNIFICANT CHANGE UP
WBC # BLD: 7.19 K/UL — SIGNIFICANT CHANGE UP (ref 3.8–10.5)
WBC # FLD AUTO: 7.19 K/UL — SIGNIFICANT CHANGE UP (ref 3.8–10.5)

## 2022-04-04 PROCEDURE — 99285 EMERGENCY DEPT VISIT HI MDM: CPT

## 2022-04-04 PROCEDURE — 70551 MRI BRAIN STEM W/O DYE: CPT | Mod: 26,MA

## 2022-04-04 PROCEDURE — 71046 X-RAY EXAM CHEST 2 VIEWS: CPT | Mod: 26

## 2022-04-04 PROCEDURE — 70450 CT HEAD/BRAIN W/O DYE: CPT | Mod: 26,MG

## 2022-04-04 PROCEDURE — 99222 1ST HOSP IP/OBS MODERATE 55: CPT

## 2022-04-04 PROCEDURE — 71275 CT ANGIOGRAPHY CHEST: CPT | Mod: 26

## 2022-04-04 PROCEDURE — G1004: CPT

## 2022-04-04 PROCEDURE — 70547 MR ANGIOGRAPHY NECK W/O DYE: CPT | Mod: 26,MA

## 2022-04-04 PROCEDURE — 70544 MR ANGIOGRAPHY HEAD W/O DYE: CPT | Mod: 26,59,MA

## 2022-04-04 RX ORDER — ALBUTEROL 90 UG/1
2 AEROSOL, METERED ORAL
Qty: 0 | Refills: 0 | DISCHARGE

## 2022-04-04 RX ORDER — AMLODIPINE BESYLATE 2.5 MG/1
2.5 TABLET ORAL DAILY
Refills: 0 | Status: DISCONTINUED | OUTPATIENT
Start: 2022-04-04 | End: 2022-04-05

## 2022-04-04 RX ORDER — AZITHROMYCIN 500 MG/1
0 TABLET, FILM COATED ORAL
Qty: 0 | Refills: 0 | DISCHARGE

## 2022-04-04 RX ORDER — BUDESONIDE AND FORMOTEROL FUMARATE DIHYDRATE 160; 4.5 UG/1; UG/1
2 AEROSOL RESPIRATORY (INHALATION)
Qty: 0 | Refills: 0 | DISCHARGE

## 2022-04-04 RX ORDER — MECLIZINE HCL 12.5 MG
25 TABLET ORAL ONCE
Refills: 0 | Status: COMPLETED | OUTPATIENT
Start: 2022-04-04 | End: 2022-04-04

## 2022-04-04 RX ORDER — BUDESONIDE AND FORMOTEROL FUMARATE DIHYDRATE 160; 4.5 UG/1; UG/1
2 AEROSOL RESPIRATORY (INHALATION)
Refills: 0 | Status: DISCONTINUED | OUTPATIENT
Start: 2022-04-04 | End: 2022-04-05

## 2022-04-04 RX ORDER — ATORVASTATIN CALCIUM 80 MG/1
1 TABLET, FILM COATED ORAL
Qty: 0 | Refills: 0 | DISCHARGE

## 2022-04-04 RX ORDER — FLUTICASONE PROPIONATE 50 MCG
2 SPRAY, SUSPENSION NASAL
Refills: 0 | Status: DISCONTINUED | OUTPATIENT
Start: 2022-04-04 | End: 2022-04-05

## 2022-04-04 RX ORDER — FLUTICASONE PROPIONATE 50 MCG
2 SPRAY, SUSPENSION NASAL
Qty: 0 | Refills: 0 | DISCHARGE

## 2022-04-04 RX ORDER — FLUTICASONE PROPIONATE 50 MCG
1 SPRAY, SUSPENSION NASAL
Qty: 0 | Refills: 0 | DISCHARGE

## 2022-04-04 RX ORDER — AMLODIPINE BESYLATE 2.5 MG/1
1 TABLET ORAL
Qty: 0 | Refills: 0 | DISCHARGE

## 2022-04-04 RX ORDER — ATORVASTATIN CALCIUM 80 MG/1
20 TABLET, FILM COATED ORAL AT BEDTIME
Refills: 0 | Status: DISCONTINUED | OUTPATIENT
Start: 2022-04-04 | End: 2022-04-05

## 2022-04-04 RX ORDER — SODIUM CHLORIDE 9 MG/ML
1000 INJECTION INTRAMUSCULAR; INTRAVENOUS; SUBCUTANEOUS
Refills: 0 | Status: DISCONTINUED | OUTPATIENT
Start: 2022-04-04 | End: 2022-04-05

## 2022-04-04 RX ORDER — ENOXAPARIN SODIUM 100 MG/ML
40 INJECTION SUBCUTANEOUS EVERY 24 HOURS
Refills: 0 | Status: DISCONTINUED | OUTPATIENT
Start: 2022-04-04 | End: 2022-04-05

## 2022-04-04 RX ORDER — LANOLIN ALCOHOL/MO/W.PET/CERES
3 CREAM (GRAM) TOPICAL AT BEDTIME
Refills: 0 | Status: DISCONTINUED | OUTPATIENT
Start: 2022-04-04 | End: 2022-04-05

## 2022-04-04 RX ORDER — ACETAMINOPHEN 500 MG
650 TABLET ORAL EVERY 6 HOURS
Refills: 0 | Status: DISCONTINUED | OUTPATIENT
Start: 2022-04-04 | End: 2022-04-05

## 2022-04-04 RX ORDER — SODIUM CHLORIDE 9 MG/ML
1000 INJECTION INTRAMUSCULAR; INTRAVENOUS; SUBCUTANEOUS ONCE
Refills: 0 | Status: COMPLETED | OUTPATIENT
Start: 2022-04-04 | End: 2022-04-04

## 2022-04-04 RX ORDER — ALBUTEROL 90 UG/1
2 AEROSOL, METERED ORAL EVERY 6 HOURS
Refills: 0 | Status: DISCONTINUED | OUTPATIENT
Start: 2022-04-04 | End: 2022-04-05

## 2022-04-04 RX ORDER — ONDANSETRON 8 MG/1
4 TABLET, FILM COATED ORAL EVERY 8 HOURS
Refills: 0 | Status: DISCONTINUED | OUTPATIENT
Start: 2022-04-04 | End: 2022-04-05

## 2022-04-04 RX ADMIN — ENOXAPARIN SODIUM 40 MILLIGRAM(S): 100 INJECTION SUBCUTANEOUS at 18:16

## 2022-04-04 RX ADMIN — BUDESONIDE AND FORMOTEROL FUMARATE DIHYDRATE 2 PUFF(S): 160; 4.5 AEROSOL RESPIRATORY (INHALATION) at 19:50

## 2022-04-04 RX ADMIN — Medication 650 MILLIGRAM(S): at 21:28

## 2022-04-04 RX ADMIN — SODIUM CHLORIDE 1000 MILLILITER(S): 9 INJECTION INTRAMUSCULAR; INTRAVENOUS; SUBCUTANEOUS at 12:29

## 2022-04-04 RX ADMIN — SODIUM CHLORIDE 75 MILLILITER(S): 9 INJECTION INTRAMUSCULAR; INTRAVENOUS; SUBCUTANEOUS at 18:05

## 2022-04-04 RX ADMIN — ATORVASTATIN CALCIUM 20 MILLIGRAM(S): 80 TABLET, FILM COATED ORAL at 21:29

## 2022-04-04 RX ADMIN — Medication 650 MILLIGRAM(S): at 19:13

## 2022-04-04 RX ADMIN — Medication 25 MILLIGRAM(S): at 12:29

## 2022-04-04 NOTE — ED PROVIDER NOTE - OBJECTIVE STATEMENT
66 F hx htn, hld, asthma, recently treated for pneumonia c/o feeling of dizziness when she woke this morning around 7AM and went to get out of bed. Denies hx similar symptoms. Took tylenol for headache prior to coming to ED. Denies f/c, vision changes, neck pain, cp, sob, palpitations, abd pain, n/v/d, numbness, weakness.    PMD: Iveth

## 2022-04-04 NOTE — H&P ADULT - NEGATIVE ENMT SYMPTOMS
no hearing difficulty/no ear pain/no tinnitus/no vertigo/no sinus symptoms/no nasal congestion/no nasal discharge/no post-nasal discharge/no throat pain/no dysphagia no hearing difficulty/no tinnitus/no sinus symptoms/no nasal congestion/no nasal discharge/no post-nasal discharge/no throat pain/no dysphagia

## 2022-04-04 NOTE — H&P ADULT - NEGATIVE GASTROINTESTINAL SYMPTOMS
no nausea/no vomiting/no constipation/no flatulence/no abdominal pain/no melena/no hematochezia/no steatorrhea/no jaundice

## 2022-04-04 NOTE — CONSULT NOTE ADULT - ASSESSMENT
Monica is a 67 yo F with PMH HTN, HLD, mild GEORGINA, pre-diabetes, asthma, vitamin D deficiency presents to the ED with weakness and dizziness. She was recently treated for pneumonia, with a sputum cx positive for H. influenza.    - unclear etiology of her generalized weakness/fatigue, though suggest post infectious symptoms, rather than cardiac  - no sign of acute ischemia  - no sign of volume overloaded, and appears dry on exam  - ekg demonstrates a sr without worrisome findings, and her normal rhythm doesn't explain her dizziness.  - can check routine echocardiogram  - infectious/metabolic work up is in progress  - trend creatinine and electrolytes. Keep K>4, mg>2  - will follow with you Monica is a 67 yo F with PMH HTN, HLD, mild GEORGINA, pre-diabetes, asthma, vitamin D deficiency presents to the ED with weakness and dizziness. She was recently treated for pneumonia, with a sputum cx positive for H. influenza.    - unclear etiology of her generalized weakness/fatigue, though suggest post infectious symptoms, rather than cardiac  - no sign of acute ischemia  - no sign of volume overloaded, and appears dry on exam  - ekg demonstrates a sr without worrisome findings, and her normal rhythm doesn't explain her dizziness.  - can check routine echocardiogram  - cont with amlodipine for htn  - infectious/metabolic work up is in progress  - trend creatinine and electrolytes. Keep K>4, mg>2  - will follow with you

## 2022-04-04 NOTE — DISCHARGE NOTE PROVIDER - NSDCFUADDAPPT_GEN_ALL_CORE_FT
- Please make an appointment for follow up with your primary doctor in 1 week of discharge  - Please make an appointment for follow up with Pulm and Vascular (information above)  - Patient or caretaker is responsible for making all appointments  - Please return to the ED if you develop worsening symptoms or fever

## 2022-04-04 NOTE — DISCHARGE NOTE PROVIDER - NPI NUMBER (FOR SYSADMIN USE ONLY) :
[[id number]] [[id number]],[[id number]],[[id number]] [[id number]],[[id number]],[[id number]]

## 2022-04-04 NOTE — DISCHARGE NOTE PROVIDER - CARE PROVIDER_API CALL
RAHUL BAPTISTE  Internal Medicine  [address] [place] [address]  Phone: [phone]  Fax: [phone]  Follow Up Time: 1 week    William Roque)  Critical Care Medicine; Pulmonary Disease  [address] [facility], [address]  [place], [place] [address]  Phone: [phone]  Fax: [phone]  Follow Up Time: 1 week    Leidy Babin  NEUROLOGY  [address] [place] [address]  Phone: [phone]  Fax: [phone]  Follow Up Time: 1 week   RAHUL BAPTISTE  Internal Medicine  [address] [place] [address]  Phone: [phone]  Fax: [phone]  Follow Up Time: 1 week    William Roque)  Critical Care Medicine; Pulmonary Disease  [address] [facility], [address]  [place], [place] [address]  Phone: [phone]  Fax: [phone]  Follow Up Time: 1 week    Jennifer Dominguez)  Vascular Surgery  [address] [place] [address]  Phone: [phone]  Fax: [phone]  Follow Up Time: 2 weeks   RAHUL BAPTISTE  Internal Medicine  [address] [place] [address]  Phone: [phone]  Fax: [phone]  Follow Up Time: 1 week    William Roque)  Critical Care Medicine; Pulmonary Disease  [address] [facility], [address]  [place], [place] [address]  Phone: [phone]  Fax: [phone]  Follow Up Time: 1 week    Jennifer Dominguez)  Vascular Surgery  [address] [place] [address]  Phone: [phone]  Fax: [phone]  Follow Up Time: 2 weeks    Leidy Babin  NEUROLOGY  [address] [place] [address]  Phone: [phone]  Fax: [phone]  Follow Up Time:

## 2022-04-04 NOTE — H&P ADULT - PROBLEM SELECTOR PLAN 6
Continue home inhaler regimen  Symbicort 160-4.5 mcg 2 puffs BID  Albuterol 90 mcg 1 puff inhaled q6 PRN  Respiratory status stable Continue home inhaler regimen  Symbicort 160-4.5 mcg 2 puffs BID  Albuterol 90 mcg 2 puffs inhaled q6 PRN  Respiratory status stable

## 2022-04-04 NOTE — ED PROVIDER NOTE - ATTENDING CONTRIBUTION TO CARE
case also discussed with pt's son:  pt is a 67 yo f who completed a course of abx for H.Flu Pneumonia had wbc count at that time greater than 20K, is followed by dr Lisa moore/cc.  2 weeks ago she travelled from [place] and now is dizzy off balance, and has unsteady gait.  per son she was also persistently tachycardic.  pt states that before her pneumonia she was fine with no pmhx  pmd dr Lazaro, [place]  no allergies not a smoker  on eval  pleasant female awake alert nad  heent cor chest normal  abd soft nt nd no hsm  ext normal  neuro nihss=0 gcs=15  skin normal    plan : ct labs covid rvp xray chest orhtostatic vs ekg cardiac monitor iv fluid bolus  will get mra mri ro stroke of post circulation  d-dimer ro pe dvt all plan dw son and pt aware

## 2022-04-04 NOTE — CONSULT NOTE ADULT - SUBJECTIVE AND OBJECTIVE BOX
[facility] - Maribeth Swift, Maxine, Neeru, Alicia, Federico Win  Office Number: [phone]    Initial Consult Note    CHIEF COMPLAINT: Patient is a 66y old  Female who presents with a chief complaint of weakness (04 Apr 2022 16:46)      HPI:  67 yo F with PMH HTN, HLD, mild GEORGINA, pre-diabetes, asthma, vitamin D deficiency presents to the ED with weakness and dizziness. Pt woke up around 7AM with dizziness while getting out of bed. Pt took Tylenol before coming to the ED. Pt recently came from [facility] one month ago.      Of note, pt was recently in the ED on 3/30/22 with weakness. Pt was recently treated on 3/24 for a PNA with Augmentin 875 ,g x7 days and Zpack x5 days by Pulm Dr. Roque. Pt's CXR was negative but sputum cx was positive for H. Influenza. Pt's outpatient CBC with significant for neutrophillic leukocytosis WBC: 22, neut 80%. Pt developed diarrhea after completion of abx. Pt still had a persistent productive cough with occasional wheezing.         Denies fever, chills, chest pain, palpitations, SOB, abdominal pain, nausea, vomiting, urinary frequency, urgency, or dysuria, changes in vision, numbness, tingling.  ED Course:   Vitals: BP: 121/82, HR: 105-->97, Temp: 99 F, RR: 17, SpO2: 98% on RA  Labs: no significant findings, d-dimer negative  RVP negative   UA: negative  CXR: no acute lung pathology   CT Head: no acute findings  MRI Brain: There is no mass, intracranial hemorrhage, or acute infarction.  MRA Head/Neck: No substantial intracranial arterial stenosis or large vessel occlusion. No hemodynamically significant arterial stenosis in the neck.  EKG: NSR, Hr: 93, no signs of acute ischemia   Received Meclizine 25 mg x1 and 1L NS bolus x1 in the ED  (04 Apr 2022 16:46)      PAST MEDICAL & SURGICAL HISTORY:  HLD (hyperlipidemia)    Asthmatic bronchitis , chronic    HTN (hypertension)    Mild obstructive sleep apnea    Hyperlipidemia        SOCIAL HISTORY:  No tobacco, ethanol, or drug abuse.    FAMILY HISTORY:    No family history of acute MI or sudden cardiac death.    MEDICATIONS  (STANDING):    MEDICATIONS  (PRN):  acetaminophen     Tablet .. 650 milliGRAM(s) Oral every 6 hours PRN Temp greater or equal to 38C (100.4F), Mild Pain (1 - 3)  aluminum hydroxide/magnesium hydroxide/simethicone Suspension 30 milliLiter(s) Oral every 4 hours PRN Dyspepsia  melatonin 3 milliGRAM(s) Oral at bedtime PRN Insomnia  ondansetron Injectable 4 milliGRAM(s) IV Push every 8 hours PRN Nausea and/or Vomiting      Allergies    No Known Allergies    Intolerances        REVIEW OF SYSTEMS:    CONSTITUTIONAL: reports weakness, no fevers or chills  EYES/ENT: No visual changes;  No vertigo or throat pain   NECK: No pain or stiffness  RESPIRATORY: No cough, wheezing, hemoptysis; No shortness of breath  CARDIOVASCULAR: No chest pain or palpitations  GASTROINTESTINAL: No abdominal pain. No nausea, vomiting, or hematemesis; No diarrhea or constipation. No melena or hematochezia.  GENITOURINARY: No dysuria, frequency or hematuria  NEUROLOGICAL: No numbness or weakness  SKIN: No itching or rash  All other review of systems is negative unless indicated above    VITAL SIGNS:   Vital Signs Last 24 Hrs  T(C): 37.2 (04 Apr 2022 10:25), Max: 37.2 (04 Apr 2022 10:25)  T(F): 99 (04 Apr 2022 10:25), Max: 99 (04 Apr 2022 10:25)  HR: 97 (04 Apr 2022 16:16) (97 - 105)  BP: 133/76 (04 Apr 2022 16:16) (121/82 - 133/76)  BP(mean): --  RR: 16 (04 Apr 2022 16:16) (16 - 17)  SpO2: 98% (04 Apr 2022 16:16) (98% - 98%)    I&O's Summary      On Exam:    Constitutional: NAD, alert and oriented x 3  Lungs:  Non-labored, breath sounds are clear bilaterally, No wheezing, rales or rhonchi  Cardiovascular: RRR.  S1 and S2 positive.  No murmurs, rubs, gallops or clicks  Gastrointestinal: Bowel Sounds present, soft, nontender.   Lymph: No peripheral edema. No cervical lymphadenopathy.  Neurological: Alert, no focal deficits  Skin: No rashes or ulcers   Psych:  Mood & affect appropriate.    LABS: All Labs Reviewed:                        13.0   7.19  )-----------( 303      ( 04 Apr 2022 11:41 )             39.5     04 Apr 2022 14:38    135    |  101    |  7      ----------------------------<  109    4.3     |  27     |  0.67     Ca    9.4        04 Apr 2022 14:38    TPro  6.9    /  Alb  3.6    /  TBili  0.4    /  DBili  x      /  AST  8      /  ALT  23     /  AlkPhos  71     04 Apr 2022 14:38          Blood Culture: Organism --  Gram Stain Blood -- Gram Stain --  Specimen Source .Blood Blood-Peripheral  Culture-Blood --    Organism --  Gram Stain Blood -- Gram Stain --  Specimen Source Clean Catch Clean Catch (Midstream)  Culture-Blood --        04-04 @ 14:38  TSH: 0.93      RADIOLOGY:    EKG: sr

## 2022-04-04 NOTE — DISCHARGE NOTE PROVIDER - NSDCMRMEDTOKEN_GEN_ALL_CORE_FT
albuterol 90 mcg/inh inhalation aerosol: 1-2 puff(s) inhaled every 6 hours, As Needed  Flonase 50 mcg/inh nasal spray: 2 spray(s) in each affected nostril once a day  Lipitor 20 mg oral tablet: 1 tab(s) orally once a day  Norvasc 2.5 mg oral tablet: 1 tab(s) orally once a day  Symbicort 160 mcg-4.5 mcg/inh inhalation aerosol: 2 puff(s) inhaled 2 times a day   albuterol 90 mcg/inh inhalation aerosol: 1-2 puff(s) inhaled every 6 hours, As Needed  Flonase 50 mcg/inh nasal spray: 2 spray(s) in each affected nostril once a day  Lipitor 20 mg oral tablet: 1 tab(s) orally once a day  metoprolol tartrate 25 mg oral tablet: 0.5 tab(s) orally 2 times a day   Norvasc 2.5 mg oral tablet: 1 tab(s) orally once a day  Symbicort 160 mcg-4.5 mcg/inh inhalation aerosol: 2 puff(s) inhaled 2 times a day

## 2022-04-04 NOTE — DISCHARGE NOTE PROVIDER - CARE PROVIDERS DIRECT ADDRESSES
,[email or web address],DirectAddress_Unknown,DirectAddress_Unknown ,[email or web address],DirectAddress_Unknown,DirectAddress_Unknown,DirectAddress_Unknown

## 2022-04-04 NOTE — DISCHARGE NOTE PROVIDER - PROVIDER TOKENS
PROVIDER:[TOKEN:[[id number]],FOLLOWUP:[1 week]],PROVIDER:[TOKEN:[[id number]],FOLLOWUP:[1 week]],PROVIDER:[TOKEN:[[id number]],FOLLOWUP:[1 week]] PROVIDER:[TOKEN:[[id number]],FOLLOWUP:[1 week]],PROVIDER:[TOKEN:[[id number]],FOLLOWUP:[1 week]],PROVIDER:[TOKEN:[[id number]],FOLLOWUP:[2 weeks]] PROVIDER:[TOKEN:[[id number]],FOLLOWUP:[1 week]],PROVIDER:[TOKEN:[[id number]],FOLLOWUP:[1 week]],PROVIDER:[TOKEN:[[id number]],FOLLOWUP:[2 weeks]],PROVIDER:[TOKEN:[[id number]]]

## 2022-04-04 NOTE — H&P ADULT - NSHPSOCIALHISTORY_GEN_ALL_CORE
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 No biomass exposure

## 2022-04-04 NOTE — H&P ADULT - NEGATIVE GENERAL GENITOURINARY SYMPTOMS
no hematuria/no renal colic/no flank pain L/no flank pain R/no urine discoloration/no bladder infections/no incontinence/no dysuria/normal urinary frequency

## 2022-04-04 NOTE — H&P ADULT - PROBLEM SELECTOR PLAN 5
Continue home lipitor 20 mg qhs  Check lipid panel Continue home Lipitor 20 mg qhs  Check lipid panel

## 2022-04-04 NOTE — DISCHARGE NOTE PROVIDER - NSDCCPCAREPLAN_GEN_ALL_CORE_FT
PRINCIPAL DISCHARGE DIAGNOSIS  Diagnosis: Dizzy  Assessment and Plan of Treatment:        PRINCIPAL DISCHARGE DIAGNOSIS  Diagnosis: Weakness with dizziness  Assessment and Plan of Treatment: You were admitted to the hospital for workup of generalized weakness and dizziness. CT and MRI scans were negative for stroke. Your presentation and lab work were not consistent with infectious or metabolic causes of your symptoms. It is possible your symptoms are due to a post-infectious syndrome from your recently treated pneumonia, with diarrhea (as a side effect from antibiotics) and decreased eating and drinking causing dehydration that worsened your symptoms.  Please drink water to stay hydrated.  Please follow up with your primary care physician and pulmonologist.  Please follow up with your neurologist.  Please return to the emergency department if you experience fever, chills, chest pain, shortness of breath, worsening dizziness, nausea/vomiting, inability to eat/drink, or any symptom you feel requires evaluation by a physician.      SECONDARY DISCHARGE DIAGNOSES  Diagnosis: Tachycardia  Assessment and Plan of Treatment: You had elevated heart rate in the hospital. This was likely due to dehydration and weakness, but you had a CT scan of your chest to evaluate for a blood clot - this study showed no blood clot but did reveal _______(official finding)_____.   You had an echocardiogram which revealed ________.  Please follow up with ______________.    Diagnosis: Diarrhea  Assessment and Plan of Treatment: You were treated with IV fluids for dehydration. Please drink water and stay hydrated after you leave the hospital. Please return to the emergency department if you experience worsening diarrhea, vomiting, dehydration.    Diagnosis: Hypertension  Assessment and Plan of Treatment: Please continue taking your home blood pressure medication and follow up with your PCP.    Diagnosis: Hyperlipidemia  Assessment and Plan of Treatment: Please continue taking your home cholesterol medication and follow up with your PCP.    Diagnosis: Asthma  Assessment and Plan of Treatment: Please continue taking your home asthma medications and follow up with your pulmonologist.     PRINCIPAL DISCHARGE DIAGNOSIS  Diagnosis: Weakness with dizziness  Assessment and Plan of Treatment: You were admitted to the hospital for workup of generalized weakness and dizziness. CT and MRI scans were negative for stroke. Your presentation and lab work were not consistent with infectious or metabolic causes of your symptoms. It is possible your symptoms are due to a post-infectious syndrome from your recently treated pneumonia, with diarrhea (as a side effect from antibiotics) and decreased eating and drinking causing dehydration that worsened your symptoms.  Please drink water to stay hydrated.  Please follow up with your primary care physician and pulmonologist.  Please return to the emergency department if you experience fever, chills, chest pain, shortness of breath, worsening dizziness, nausea/vomiting, inability to eat/drink, or any symptom you feel requires evaluation by a physician.      SECONDARY DISCHARGE DIAGNOSES  Diagnosis: Abnormal finding on imaging  Assessment and Plan of Treatment: You had a CT angio of your chest to check for a blood clot, and it showed no blood clot, but it did reveal several incidental findings:  1. 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 Chamberlain  - Please have a repeat CT angio of the chest in 6 months  2. 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 Your TSH (thyroid stimulating hormone) level was normal.   3. Subcentimeter pulmonary nodules up to 4mm  - Please follow up with your pulmonologist Dr. Roque  - Please have follow-up CT scan of your chest according to your pulmonologist.    Diagnosis: Tachycardia  Assessment and Plan of Treatment: You had elevated heart rate in the hospital. This was likely due to dehydration and weakness, but you had a CT scan of your chest to evaluate for a blood clot - this study showed no blood clot. You had an echocardiogram which revealed _____. Please follow up with your PCP and cardiologist.    Diagnosis: Diarrhea  Assessment and Plan of Treatment: You were treated with IV fluids for dehydration. Please drink water and stay hydrated after you leave the hospital. Please return to the emergency department if you experience worsening diarrhea, vomiting, dehydration.    Diagnosis: Hypertension  Assessment and Plan of Treatment: Please continue taking your home blood pressure medication and follow up with your PCP.    Diagnosis: Hyperlipidemia  Assessment and Plan of Treatment: Please continue taking your home cholesterol medication and follow up with your PCP.    Diagnosis: Asthma  Assessment and Plan of Treatment: Please continue taking your home asthma medications and follow up with your pulmonologist.     PRINCIPAL DISCHARGE DIAGNOSIS  Diagnosis: Weakness with dizziness  Assessment and Plan of Treatment: You were admitted to the hospital for workup of generalized weakness and dizziness. Neurology and Cardiology evaluated you during your hospital stay.   - CT and MRI scans were negative for stroke.   - Your presentation and lab work were not consistent with infectious or metabolic causes of your symptoms. It is possible your symptoms are due to a post-infectious syndrome from your recently treated pneumonia, with diarrhea (as a side effect from antibiotics) and decreased eating and drinking causing dehydration that worsened your symptoms.  - Please drink water to stay hydrated.  - Please follow up with your primary care physician and pulmonologist.  - Please return to the emergency department if you experience fever, chills, chest pain, shortness of breath, worsening dizziness, nausea/vomiting, inability to eat/drink, or any symptom you feel requires evaluation by a physician.      SECONDARY DISCHARGE DIAGNOSES  Diagnosis: Tachycardia  Assessment and Plan of Treatment: You had elevated heart rate in the hospital and Cardiology evaluated you.   - This was likely due to dehydration and weakness, but you had a CT scan of your chest to evaluate for a blood clot - this study showed no blood clot in your lungs. You had an echocardiogram which revealed _____.   - Please follow up with your PCP  within one week of discharge.    Diagnosis: Diarrhea  Assessment and Plan of Treatment: You were treated with IV fluids for dehydration. Please drink water and stay hydrated after you leave the hospital.   - Please follow up with your PCP  within one week of discharge.   - Please return to the emergency department if you experience worsening diarrhea, vomiting, dehydration.    Diagnosis: Hypertension  Assessment and Plan of Treatment: Please continue taking your home Amlodipine and follow up with your PCP.    Diagnosis: Hyperlipidemia  Assessment and Plan of Treatment: Please continue taking your home Lipiroe and follow up with your PCP.    Diagnosis: Asthma  Assessment and Plan of Treatment: Please continue taking your home Symbicort and Albuterol and follow up with your pulmonologist Dr. Roque.    Diagnosis: Abnormal finding on imaging  Assessment and Plan of Treatment: You had a CT angio of your chest to check for a blood clot, and it showed no blood clot, but it did reveal several incidental findings:  1. 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 Chamberlain  - Please have a repeat CT angio of the chest in 6 months  2. 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 Your TSH (thyroid stimulating hormone) level was normal.   3. Subcentimeter pulmonary nodules up to 4mm  - Please follow up with your pulmonologist Dr. Roque  - Please have follow-up CT scan of your chest according to your pulmonologist.     PRINCIPAL DISCHARGE DIAGNOSIS  Diagnosis: Weakness with dizziness  Assessment and Plan of Treatment: You were admitted to the hospital for workup of generalized weakness and dizziness. Neurology and Cardiology evaluated you during your hospital stay.   - CT and MRI scans were negative for stroke.   - Your presentation and lab work were not consistent with infectious or metabolic causes of your symptoms. It is possible your symptoms are due to a post-infectious syndrome from your recently treated pneumonia, with diarrhea (as a side effect from antibiotics) and decreased eating and drinking causing dehydration that worsened your symptoms.  - Please drink water to stay hydrated.  - Please follow up with your primary care physician and pulmonologist.  - Please return to the emergency department if you experience fever, chills, chest pain, shortness of breath, worsening dizziness, nausea/vomiting, inability to eat/drink, or any symptom you feel requires evaluation by a physician.      SECONDARY DISCHARGE DIAGNOSES  Diagnosis: Tachycardia  Assessment and Plan of Treatment: You had elevated heart rate in the hospital and Cardiology evaluated you.   - This was likely due to dehydration and weakness, but you had a CT scan of your chest to evaluate for a blood clot - this study showed no blood clot in your lungs. You had an echocardiogram which was normal.  - Please follow up with your PCP  within one week of discharge.    Diagnosis: Diarrhea  Assessment and Plan of Treatment: You were treated with IV fluids for dehydration. Please drink water and stay hydrated after you leave the hospital.   - Please follow up with your PCP  within one week of discharge.   - Please return to the emergency department if you experience worsening diarrhea, vomiting, dehydration.    Diagnosis: Abnormal finding on imaging  Assessment and Plan of Treatment: You had a CT angio of your chest to check for a blood clot, and it showed no blood clot, but it did reveal several incidental findings:  1. 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 Chamberlain  - Please have a repeat CT angio of the chest in 6 months  2. 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 Your TSH (thyroid stimulating hormone) level was normal.   3. Subcentimeter pulmonary nodules up to 4mm  - Please follow up with your pulmonologist Dr. Roque  - Please have follow-up CT scan of your chest according to your pulmonologist.    Diagnosis: Hypertension  Assessment and Plan of Treatment: Please continue taking your home Amlodipine and follow up with your PCP.    Diagnosis: Hyperlipidemia  Assessment and Plan of Treatment: Please continue taking your home Lipiroe and follow up with your PCP.    Diagnosis: Asthma  Assessment and Plan of Treatment: Please continue taking your home Symbicort and Albuterol and follow up with your pulmonologist Dr. Roque.     PRINCIPAL DISCHARGE DIAGNOSIS  Diagnosis: Weakness with dizziness  Assessment and Plan of Treatment: You were admitted to the hospital for workup of generalized weakness and dizziness. Neurology and Cardiology evaluated you during your hospital stay.   - CT and MRI scans were negative for stroke.   - Your presentation and lab work were not consistent with infectious or metabolic causes of your symptoms. It is possible your symptoms are due to a post-infectious syndrome from your recently treated pneumonia, with diarrhea (as a side effect from antibiotics) and decreased eating and drinking causing dehydration that worsened your symptoms.  - Please drink water to stay hydrated.  - Please follow up with your primary care physician and pulmonologist.  - Please return to the emergency department if you experience fever, chills, chest pain, shortness of breath, worsening dizziness, nausea/vomiting, inability to eat/drink, or any symptom you feel requires evaluation by a physician.      SECONDARY DISCHARGE DIAGNOSES  Diagnosis: Abnormal finding on imaging  Assessment and Plan of Treatment: You had a CT angio of your chest to check for a blood clot, and it showed no blood clot, but it did reveal several incidental findings:  1. 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 Chamberlain  - Please have a repeat CT angio of the chest in 6 months  - Please start taking metoprolol 12.5mg two times a day. A prescription has been sent to your pharmacy ([facility]). Please follow up with your cardiologist.  2. 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 Your TSH (thyroid stimulating hormone) level was normal.   3. Subcentimeter pulmonary nodules up to 4mm  - Please follow up with your pulmonologist Dr. Roque  - Please have follow-up CT scan of your chest according to your pulmonologist.    Diagnosis: Tachycardia  Assessment and Plan of Treatment: You had elevated heart rate in the hospital and Cardiology evaluated you.   - This was likely due to dehydration and weakness, but you had a CT scan of your chest to evaluate for a blood clot - this study showed no blood clot in your lungs. You had an echocardiogram which was normal.  - Please follow up with your PCP  within one week of discharge.    Diagnosis: Diarrhea  Assessment and Plan of Treatment: You were treated with IV fluids for dehydration. Please drink water and stay hydrated after you leave the hospital.   - Please follow up with your PCP  within one week of discharge.   - Please return to the emergency department if you experience worsening diarrhea, vomiting, dehydration.    Diagnosis: Hypertension  Assessment and Plan of Treatment: Please continue taking your home Amlodipine and follow up with your PCP.    Diagnosis: Hyperlipidemia  Assessment and Plan of Treatment: Please continue taking your home Lipiroe and follow up with your PCP.    Diagnosis: Asthma  Assessment and Plan of Treatment: Please continue taking your home Symbicort and Albuterol and follow up with your pulmonologist Dr. Roque.     PRINCIPAL DISCHARGE DIAGNOSIS  Diagnosis: Weakness with dizziness  Assessment and Plan of Treatment: You were admitted to the hospital for workup of generalized weakness and dizziness, likely Post Pneumonia.  - Neurology and Cardiology evaluated you during your hospital stay.   - CT and MRI scans were negative for stroke.   - Your presentation and lab work were not consistent with infectious or metabolic causes of your symptoms. It is possible your symptoms are due to a post-infectious syndrome from your recently treated pneumonia, with diarrhea (as a side effect from antibiotics) and decreased eating and drinking causing dehydration that worsened your symptoms.  - Please drink water to stay hydrated.  - Please follow up with your primary care physician and pulmonologist.  - Please return to the emergency department if you experience fever, chills, chest pain, shortness of breath, worsening dizziness, nausea/vomiting, inability to eat/drink, or any symptom you feel requires evaluation by a physician.      SECONDARY DISCHARGE DIAGNOSES  Diagnosis: Hypertension  Assessment and Plan of Treatment: Please continue taking your home Amlodipine and follow up with your PCP.    Diagnosis: Tachycardia  Assessment and Plan of Treatment: You had elevated heart rate in the hospital and Cardiology evaluated you.   - This was likely due to dehydration and weakness, but you had a CT Angio chest scan of your chest to evaluate for a blood clot - this study showed no blood clot in your lungs. You had an echocardiogram which was normal.  -ECHO-Grossly normal waiting for official read  -Started on Low dose Lopressor 2x day  - Please follow up with your PCP  within one week of discharge.    Diagnosis: Abnormal finding on imaging  Assessment and Plan of Treatment: You had a CT angio of your chest to check for a blood clot, and it showed no blood clot, but it did reveal several incidental findings:  1. 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 Chamberlain  - Please have a repeat CT angio of the chest in 6 months  - Please start taking metoprolol 12.5mg two times a day. A prescription has been sent to your pharmacy ([facility]). Please follow up with your cardiologist.  2. 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 Your TSH (thyroid stimulating hormone) level was normal.   3. Subcentimeter pulmonary nodules up to 4mm  - Please follow up with your pulmonologist Dr. Roque  - Please have follow-up CT scan of your chest according to your pulmonologist.    Diagnosis: Diarrhea  Assessment and Plan of Treatment: Likely related to Oral Antibiotics that you took for your Pneumonia   -You were treated with IV fluids for dehydration.   -Follow Stool GI PCR results  -Please drink water and stay hydrated after you leave the hospital.   - Please follow up with your PCP  within one week of discharge.   - Please return to the emergency department if you experience worsening diarrhea, vomiting, dehydration.    Diagnosis: Hyperlipidemia  Assessment and Plan of Treatment: Please continue taking your home Lipiroe and follow up with your PCP.    Diagnosis: Asthma  Assessment and Plan of Treatment: Please continue taking your home Symbicort and Albuterol and follow up with your pulmonologist Dr. Roque.

## 2022-04-04 NOTE — ED PROVIDER NOTE - NSICDXPASTMEDICALHX_GEN_ALL_CORE_FT
PAST MEDICAL HISTORY:  Asthmatic bronchitis , chronic     HLD (hyperlipidemia)     HTN (hypertension)     Hyperlipidemia     Mild obstructive sleep apnea

## 2022-04-04 NOTE — H&P ADULT - NEUROLOGICAL DETAILS
alert and oriented x 3/responds to pain/responds to verbal commands/sensation intact/deep reflexes intact/cranial nerves intact/normal strength alert and oriented x 3/responds to pain/responds to verbal commands/sensation intact/cranial nerves intact/normal strength

## 2022-04-04 NOTE — H&P ADULT - PROBLEM SELECTOR PLAN 2
Pt with persistent diarrhea after initiating abx   Last episode of diarrhea night prior to admission   Will check GI PCR   Start IV NS 75 cc/hr

## 2022-04-04 NOTE — ED PROVIDER NOTE - PROGRESS NOTE DETAILS
case dw family and pt, aware of labs mr ct   pt to be admitted on for cardiac monitoring, dr Javi Dillard accepts pt to tele for monitoring

## 2022-04-04 NOTE — H&P ADULT - ASSESSMENT
65 yo F with PMH HTN, HLD, mild GEORGINA, pre-diabetes, asthma, vitamin D deficiency presents to the ED with weakness and dizziness. 65 yo F with PMH HTN, HLD, mild GEORGINA, pre-diabetes, asthma, vitamin D deficiency presents to the ED with weakness, decreased PO and lightheadedness. Admit for further evaluation

## 2022-04-04 NOTE — ED PROVIDER NOTE - CRANIAL NERVE AND PUPILLARY EXAM
no facial droop/cranial nerves 2-12 intact/central and peripheral vision intact/extra-ocular movements intact/tongue is midline

## 2022-04-04 NOTE — H&P ADULT - ATTENDING COMMENTS
67 yo F with PMH HTN, HLD, mild GEORGINA, pre-diabetes, asthma, vitamin D deficiency presents to the ED with weakness, decreased PO and lightheadedness. Admit for further evaluation .  Pt seen, Examined, case & care plan d/w pt, residents at detail.  Care plan d/w DR Babin-Neurology  D/W DR Caballero-Cardiology, Tele, ECHO  AM labs   PO diet  DVT PPX

## 2022-04-04 NOTE — ED ADULT NURSE NOTE - NSIMPLEMENTINTERV_GEN_ALL_ED
Implemented All Fall with Harm Risk Interventions:  [place] to call system. Call bell, personal items and telephone within reach. Instruct patient to call for assistance. Room bathroom lighting operational. Non-slip footwear when patient is off stretcher. Physically safe environment: no spills, clutter or unnecessary equipment. Stretcher in lowest position, wheels locked, appropriate side rails in place. Provide visual cue, wrist band, yellow gown, etc. Monitor gait and stability. Monitor for mental status changes and reorient to person, place, and time. Review medications for side effects contributing to fall risk. Reinforce activity limits and safety measures with patient and family. Provide visual clues: red socks.

## 2022-04-04 NOTE — DISCHARGE NOTE PROVIDER - HOSPITAL COURSE
ADMISSION DATE:  04-04-22    ---  FROM ADMISSION H+P:   HPI:  65 yo F with PMH HTN, HLD, mild GEORGINA, pre-diabetes, asthma, vitamin D deficiency presents to the ED with weakness and lightheadedness. Pt says that for the past week and a half she has been feeling very weak. Has been sleeping for about 15 hours per day which is very unusual for her. This morning she woke up with persistent weakness, lethargy and felt very lightheaded. She said she felt off-balanced which lasted for a few minutes so she came in to the ER for further evaluation. Of note, pt recently came from [facility] about 2-3 weeks ago. Pt was recently treated on 3/24 for a PNA with Augmentin 875 ,g x7 days and Zpack x5 days by Pulm Dr. Roque. Pt's CXR was negative but sputum cx was positive for H. Influenza. Her respiratory symptoms have resolved, no longer has cough or shortness of breath. Other symptoms she reports is b/l ear pain. Saw an ENT dr last week for it who performed hearing tests which were WNL. Prescribed Flonase which significantly relieves those symptoms. She reports diarrhea since starting her abx, last episode of diarrhea was last night she reports. Typically has 1-2 loose stools per day. She admits to decrease PO, loss of appetite and a 6 lb weight loss in a week and a half. Otherwise, she denies vision changes, hearing changes, sore throat, nasal congestion, chest pain, dyspnea, abdominal pain, nausea or vomiting. Denies focal weakness, numbness or tingling. Denies night sweats or fevers.       ED Course:   Vitals: BP: 121/82, HR: 105-->97, Temp: 99 F, RR: 17, SpO2: 98% on RA  Labs: no significant findings, d-dimer negative  RVP negative   UA: negative  CXR: no acute lung pathology   CT Head: no acute findings  MRI Brain: There is no mass, intracranial hemorrhage, or acute infarction.  MRA Head/Neck: No substantial intracranial arterial stenosis or large vessel occlusion. No hemodynamically significant arterial stenosis in the neck.  EKG: NSR, Hr: 93, no signs of acute ischemia   Received Meclizine 25 mg x1 and 1L NS bolus x1 in the ED  (04 Apr 2022 16:46)      ---  HOSPITAL COURSE/PERTINENT LABS/PROCEDURES PERFORMED/PENDING TESTS:   Pt was admitted for       Patient is stable for discharge as per primary medical team and consultants.    PT consulted, recommends discharge ______    Patient showed improvement throughout hospitalization. Patient was seen and examined on day of discharge. Patient was medically optimized for discharge with close outpatient follow up.    ---  PATIENT CONDITION:  - stable    --  VITALS:   T(C): 36.6 (04-04-22 @ 18:10), Max: 37.2 (04-04-22 @ 10:25)  HR: 90 (04-04-22 @ 18:10) (90 - 105)  BP: 121/77 (04-04-22 @ 18:10) (121/77 - 133/76)  RR: 16 (04-04-22 @ 18:10) (16 - 17)  SpO2: 94% (04-04-22 @ 18:10) (94% - 98%)    PHYSICAL EXAM ON DAY OF DISCHARGE:    ---  CONSULTANTS:   -    ---  ADVANCED CARE PLANNING:  - Code status:      - MOLST completed:      [  ] NO     [  ] YES    ---  TIME SPENT:  I, the attending physician, was physically present for the key portions of the evaluation and management (E/M) service provided. The total amount of time spent reviewing the hospital notes, laboratory values, imaging findings, assessing/counseling the patient, discussing with consultant physicians, social work, nursing staff was -- minutes       ADMISSION DATE:  04-04-22    ---  FROM ADMISSION H+P:   HPI:  67 yo F with PMH HTN, HLD, mild GEORGINA, pre-diabetes, asthma, vitamin D deficiency presents to the ED with weakness and lightheadedness. Pt says that for the past week and a half she has been feeling very weak. Has been sleeping for about 15 hours per day which is very unusual for her. This morning she woke up with persistent weakness, lethargy and felt very lightheaded. She said she felt off-balanced which lasted for a few minutes so she came in to the ER for further evaluation. Of note, pt recently came from [facility] about 2-3 weeks ago. Pt was recently treated on 3/24 for a PNA with Augmentin 875 ,g x7 days and Zpack x5 days by Pulm Dr. Roque. Pt's CXR was negative but sputum cx was positive for H. Influenza. Her respiratory symptoms have resolved, no longer has cough or shortness of breath. Other symptoms she reports is b/l ear pain. Saw an ENT dr last week for it who performed hearing tests which were WNL. Prescribed Flonase which significantly relieves those symptoms. She reports diarrhea since starting her abx, last episode of diarrhea was last night she reports. Typically has 1-2 loose stools per day. She admits to decrease PO, loss of appetite and a 6 lb weight loss in a week and a half. Otherwise, she denies vision changes, hearing changes, sore throat, nasal congestion, chest pain, dyspnea, abdominal pain, nausea or vomiting. Denies focal weakness, numbness or tingling. Denies night sweats or fevers.       ED Course:   Vitals: BP: 121/82, HR: 105-->97, Temp: 99 F, RR: 17, SpO2: 98% on RA  Labs: no significant findings, d-dimer negative  RVP negative   UA: negative  CXR: no acute lung pathology   CT Head: no acute findings  MRI Brain: There is no mass, intracranial hemorrhage, or acute infarction.  MRA Head/Neck: No substantial intracranial arterial stenosis or large vessel occlusion. No hemodynamically significant arterial stenosis in the neck.  EKG: NSR, Hr: 93, no signs of acute ischemia   Received Meclizine 25 mg x1 and 1L NS bolus x1 in the ED  (04 Apr 2022 16:46)      ---  HOSPITAL COURSE/PERTINENT LABS/PROCEDURES PERFORMED/PENDING TESTS:   Pt was admitted on 4/4/22 for generalized weakness and dizziness.  CT head, MRI head, and MRA head/neck unrevealing. Pt was tachycardic - D-dimer was negative. CTA chest prelim read was negative for PE but did reveal age-indeterminate 1.7 x 0.8cm focal outpouching from distal descending thoracic aorta compatible with penetrating ulcer/pseudoaneurysm. Official radiology report pending. EBV testing consistent with past infection. Other workup unrevealing for life-threatening cause of symptoms.    No obvious infectious, anatomic, metabolic causes of symptoms. Pt afebrile, nontoxic appearing. Possibly post-viral/post-infectious from recent pneumonia coupled with dehydration from abx-related diarrhea and decreased PO intake.      tachycardia - CTA pending final read  HTN - well controlled on home meds  HLD - home statin, lipid panel ordered  asthma - well controlled on home meds  DVT ppx - lovenox          Patient is stable for discharge as per primary medical team and consultants.    PT consulted, recommends discharge ______    Patient showed improvement throughout hospitalization. Patient was seen and examined on day of discharge. Patient was medically optimized for discharge with close outpatient follow up.    ---  PATIENT CONDITION:  - stable    --  VITALS:   T(C): 36.6 (04-04-22 @ 18:10), Max: 37.2 (04-04-22 @ 10:25)  HR: 90 (04-04-22 @ 18:10) (90 - 105)  BP: 121/77 (04-04-22 @ 18:10) (121/77 - 133/76)  RR: 16 (04-04-22 @ 18:10) (16 - 17)  SpO2: 94% (04-04-22 @ 18:10) (94% - 98%)    PHYSICAL EXAM ON DAY OF DISCHARGE:    ---  CONSULTANTS:   - Neuro - Dr. Babin  - Cardio - Dr. Caballero    ---  ADVANCED CARE PLANNING:  - Code status:      - [facility] completed:      [  ] NO     [  ] YES    ---  TIME SPENT:  I, the attending physician, was physically present for the key portions of the evaluation and management (E/M) service provided. The total amount of time spent reviewing the hospital notes, laboratory values, imaging findings, assessing/counseling the patient, discussing with consultant physicians, social work, nursing staff was -- minutes       ADMISSION DATE:  04-04-22    ---  FROM ADMISSION H+P:   HPI:  67 yo F with PMH HTN, HLD, mild GEORGINA, pre-diabetes, asthma, vitamin D deficiency presents to the ED with weakness and lightheadedness. Pt says that for the past week and a half she has been feeling very weak. Has been sleeping for about 15 hours per day which is very unusual for her. This morning she woke up with persistent weakness, lethargy and felt very lightheaded. She said she felt off-balanced which lasted for a few minutes so she came in to the ER for further evaluation. Of note, pt recently came from [facility] about 2-3 weeks ago. Pt was recently treated on 3/24 for a PNA with Augmentin 875 ,g x7 days and Zpack x5 days by Pulm Dr. Roque. Pt's CXR was negative but sputum cx was positive for H. Influenza. Her respiratory symptoms have resolved, no longer has cough or shortness of breath. Other symptoms she reports is b/l ear pain. Saw an ENT dr last week for it who performed hearing tests which were WNL. Prescribed Flonase which significantly relieves those symptoms. She reports diarrhea since starting her abx, last episode of diarrhea was last night she reports. Typically has 1-2 loose stools per day. She admits to decrease PO, loss of appetite and a 6 lb weight loss in a week and a half. Otherwise, she denies vision changes, hearing changes, sore throat, nasal congestion, chest pain, dyspnea, abdominal pain, nausea or vomiting. Denies focal weakness, numbness or tingling. Denies night sweats or fevers.       ED Course:   Vitals: BP: 121/82, HR: 105-->97, Temp: 99 F, RR: 17, SpO2: 98% on RA  Labs: no significant findings, d-dimer negative  RVP negative   UA: negative  CXR: no acute lung pathology   CT Head: no acute findings  MRI Brain: There is no mass, intracranial hemorrhage, or acute infarction.  MRA Head/Neck: No substantial intracranial arterial stenosis or large vessel occlusion. No hemodynamically significant arterial stenosis in the neck.  EKG: NSR, Hr: 93, no signs of acute ischemia   Received Meclizine 25 mg x1 and 1L NS bolus x1 in the ED  (04 Apr 2022 16:46)      ---  HOSPITAL COURSE/PERTINENT LABS/PROCEDURES PERFORMED/PENDING TESTS:   Pt was admitted on 4/4/22 for generalized weakness and dizziness.  CT head, MRI head, and MRA head/neck unrevealing. Pt was tachycardic - D-dimer was negative. CTA chest prelim read was negative for PE but did reveal age-indeterminate 1.7 x 0.8cm focal outpouching from distal descending thoracic aorta compatible with penetrating ulcer/pseudoaneurysm. Official radiology report pending. EBV testing consistent with past infection. Other workup unrevealing for life-threatening cause of symptoms.    No obvious infectious, anatomic, metabolic causes of symptoms. Pt afebrile, nontoxic appearing. Possibly post-viral/post-infectious from recent pneumonia coupled with dehydration from abx-related diarrhea and decreased PO intake.      tachycardia - CTA chest - No PE, no pneumonia, distal descending thoracic aorta penetrating ulcer, 0.9cm (depth) x 2.0cm. Consider vascular surgery follow-up and CTA chest in 6 months. 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 Correlate with nuclear medicine thyroid scan for confirmation. Subcentimeter pulmonary nodules up to 4mm can be followed on subsequent imaging.  HTN - well controlled on home meds  HLD - home statin, lipid panel ordered  asthma - well controlled on home meds  DVT ppx - lovenox          Patient is stable for discharge as per primary medical team and consultants.    PT consulted, recommends discharge home.    Patient showed improvement throughout hospitalization. Patient was seen and examined on day of discharge. Patient was medically optimized for discharge with close outpatient follow up.    ---  PATIENT CONDITION:  - stable    ---  CONSULTANTS:   - Neuro - Dr. Babin  - Cardio - Dr. Caballero    ---  ADVANCED CARE PLANNING:  - Code status: Full code  - MOLST completed:      [  ] NO     [  ] YES    ---  TIME SPENT:  I, the attending physician, was physically present for the key portions of the evaluation and management (E/M) service provided. The total amount of time spent reviewing the hospital notes, laboratory values, imaging findings, assessing/counseling the patient, discussing with consultant physicians, social work, nursing staff was -- minutes       ADMISSION DATE:  04-04-22    ---  FROM ADMISSION H+P:   HPI:  67 yo F with PMH HTN, HLD, mild GEORGINA, pre-diabetes, asthma, vitamin D deficiency presents to the ED with weakness and lightheadedness. Pt says that for the past week and a half she has been feeling very weak. Has been sleeping for about 15 hours per day which is very unusual for her. This morning she woke up with persistent weakness, lethargy and felt very lightheaded. She said she felt off-balanced which lasted for a few minutes so she came in to the ER for further evaluation. Of note, pt recently came from [facility] about 2-3 weeks ago. Pt was recently treated on 3/24 for a PNA with Augmentin 875 ,g x7 days and Zpack x5 days by Pulm Dr. Roque. Pt's CXR was negative but sputum cx was positive for H. Influenza. Her respiratory symptoms have resolved, no longer has cough or shortness of breath. Other symptoms she reports is b/l ear pain. Saw an ENT dr last week for it who performed hearing tests which were WNL. Prescribed Flonase which significantly relieves those symptoms. She reports diarrhea since starting her abx, last episode of diarrhea was last night she reports. Typically has 1-2 loose stools per day. She admits to decrease PO, loss of appetite and a 6 lb weight loss in a week and a half. Otherwise, she denies vision changes, hearing changes, sore throat, nasal congestion, chest pain, dyspnea, abdominal pain, nausea or vomiting. Denies focal weakness, numbness or tingling. Denies night sweats or fevers.       ED Course:   Vitals: BP: 121/82, HR: 105-->97, Temp: 99 F, RR: 17, SpO2: 98% on RA  Labs: no significant findings, d-dimer negative  RVP negative   UA: negative  CXR: no acute lung pathology   CT Head: no acute findings  MRI Brain: There is no mass, intracranial hemorrhage, or acute infarction.  MRA Head/Neck: No substantial intracranial arterial stenosis or large vessel occlusion. No hemodynamically significant arterial stenosis in the neck.  EKG: NSR, Hr: 93, no signs of acute ischemia   Received Meclizine 25 mg x1 and 1L NS bolus x1 in the ED  (04 Apr 2022 16:46)      ---  HOSPITAL COURSE/PERTINENT LABS/PROCEDURES PERFORMED/PENDING TESTS:   Pt was admitted on 4/4/22 for generalized weakness and dizziness.  Neuro and Cardio were consulted. CT head, MRI head, and MRA head/neck unrevealing. Pt was tachycardic - D-dimer was negative. CTA chest prelim read was negative for PE but did reveal age-indeterminate 1.7 x 0.8cm focal outpouching from distal descending thoracic aorta compatible with penetrating ulcer/pseudoaneurysm. Official radiology report pending. EBV testing consistent with past infection. Other workup unrevealing for life-threatening cause of symptoms.  No obvious infectious, anatomic, metabolic causes of symptoms. Pt afebrile, nontoxic appearing. Possibly post-viral/post-infectious from recent pneumonia coupled with dehydration from abx-related diarrhea and decreased PO intake.   tachycardia CTA chest - No PE, no pneumonia, distal descending thoracic aorta penetrating ulcer, 0.9cm (depth) x 2.0cm. Consider vascular surgery follow-up and CTA chest in 6 months. 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 Correlate with nuclear medicine thyroid scan for confirmation. Subcentimeter pulmonary nodules up to 4mm can be followed on subsequent imaging.  HTN - well controlled on home meds  HLD - home statin, lipid panel ordered  asthma - well controlled on home meds      Patient is stable for discharge as per primary medical team and consultants.    PT consulted, recommends discharge home.    Patient showed improvement throughout hospitalization. Patient was seen and examined on day of discharge. Patient was medically optimized for discharge with close outpatient follow up.    ---  PATIENT CONDITION:  - stable    ---  CONSULTANTS:   - Neuro - Dr. Babin  - Cardio - Dr. Caballero    ---  TIME SPENT:  I, the attending physician, was physically present for the key portions of the evaluation and management (E/M) service provided. The total amount of time spent reviewing the hospital notes, laboratory values, imaging findings, assessing/counseling the patient, discussing with consultant physicians, social work, nursing staff was -- minutes       ADMISSION DATE:  04-04-22    ---  FROM ADMISSION H+P:   HPI:  65 yo F with PMH HTN, HLD, mild GEORGINA, pre-diabetes, asthma, vitamin D deficiency presents to the ED with weakness and lightheadedness. Pt says that for the past week and a half she has been feeling very weak. Has been sleeping for about 15 hours per day which is very unusual for her. This morning she woke up with persistent weakness, lethargy and felt very lightheaded. She said she felt off-balanced which lasted for a few minutes so she came in to the ER for further evaluation. Of note, pt recently came from [facility] about 2-3 weeks ago. Pt was recently treated on 3/24 for a PNA with Augmentin 875 ,g x7 days and Zpack x5 days by Pulm Dr. Roque. Pt's CXR was negative but sputum cx was positive for H. Influenza. Her respiratory symptoms have resolved, no longer has cough or shortness of breath. Other symptoms she reports is b/l ear pain. Saw an ENT dr last week for it who performed hearing tests which were WNL. Prescribed Flonase which significantly relieves those symptoms. She reports diarrhea since starting her abx, last episode of diarrhea was last night she reports. Typically has 1-2 loose stools per day. She admits to decrease PO, loss of appetite and a 6 lb weight loss in a week and a half. Otherwise, she denies vision changes, hearing changes, sore throat, nasal congestion, chest pain, dyspnea, abdominal pain, nausea or vomiting. Denies focal weakness, numbness or tingling. Denies night sweats or fevers.       ED Course:   Vitals: BP: 121/82, HR: 105-->97, Temp: 99 F, RR: 17, SpO2: 98% on RA  Labs: no significant findings, d-dimer negative  RVP negative   UA: negative  CXR: no acute lung pathology   CT Head: no acute findings  MRI Brain: There is no mass, intracranial hemorrhage, or acute infarction.  MRA Head/Neck: No substantial intracranial arterial stenosis or large vessel occlusion. No hemodynamically significant arterial stenosis in the neck.  EKG: NSR, Hr: 93, no signs of acute ischemia   Received Meclizine 25 mg x1 and 1L NS bolus x1 in the ED  (04 Apr 2022 16:46)      ---  HOSPITAL COURSE/PERTINENT LABS/PROCEDURES PERFORMED/PENDING TESTS:   Pt was admitted on 4/4/22 for generalized weakness and dizziness.  Neuro and Cardio were consulted. CT head, MRI head, and MRA head/neck unrevealing. Pt was tachycardic - D-dimer was negative. CTA chest with no PE, no pneumonia, distal descending thoracic aorta penetrating ulcer, 0.9cm (depth) x 2.0cm. Consider vascular surgery follow-up and CTA chest in 6 months. 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 Correlate with nuclear medicine thyroid scan for confirmation. Subcentimeter pulmonary nodules up to 4mm can be followed on subsequent imaging. TTE was normal. EBV testing consistent with past infection. Other workup unrevealing for life-threatening cause of symptoms. No obvious infectious, anatomic, metabolic causes of symptoms. Pt afebrile, nontoxic appearing. Possibly post-viral/post-infectious from recent pneumonia coupled with dehydration from abx-related diarrhea and decreased PO intake.   Patient is stable for discharge as per primary medical team and consultants.    PT consulted, recommends discharge home.    Patient showed improvement throughout hospitalization. Patient was seen and examined on day of discharge. Patient was medically optimized for discharge with close outpatient follow up.    ---  PATIENT CONDITION:  - stable    ---  CONSULTANTS:   - Neuro - Dr. Babin  - Cardio - Dr. Caballero    ---  TIME SPENT:  I, the attending physician, was physically present for the key portions of the evaluation and management (E/M) service provided. The total amount of time spent reviewing the hospital notes, laboratory values, imaging findings, assessing/counseling the patient, discussing with consultant physicians, social work, nursing staff was -- minutes       ADMISSION DATE:  04-04-22    ---  FROM ADMISSION H+P:   HPI:  67 yo F with PMH HTN, HLD, mild GEORGINA, pre-diabetes, asthma, vitamin D deficiency presents to the ED with weakness and lightheadedness. Pt says that for the past week and a half she has been feeling very weak. Has been sleeping for about 15 hours per day which is very unusual for her. This morning she woke up with persistent weakness, lethargy and felt very lightheaded. She said she felt off-balanced which lasted for a few minutes so she came in to the ER for further evaluation. Of note, pt recently came from [facility] about 2-3 weeks ago. Pt was recently treated on 3/24 for a PNA with Augmentin 875 ,g x7 days and Zpack x5 days by Pulm Dr. Roque. Pt's CXR was negative but sputum cx was positive for H. Influenza. Her respiratory symptoms have resolved, no longer has cough or shortness of breath. Other symptoms she reports is b/l ear pain. Saw an ENT dr last week for it who performed hearing tests which were WNL. Prescribed Flonase which significantly relieves those symptoms. She reports diarrhea since starting her abx, last episode of diarrhea was last night she reports. Typically has 1-2 loose stools per day. She admits to decrease PO, loss of appetite and a 6 lb weight loss in a week and a half. Otherwise, she denies vision changes, hearing changes, sore throat, nasal congestion, chest pain, dyspnea, abdominal pain, nausea or vomiting. Denies focal weakness, numbness or tingling. Denies night sweats or fevers.       ED Course:   Vitals: BP: 121/82, HR: 105-->97, Temp: 99 F, RR: 17, SpO2: 98% on RA  Labs: no significant findings, d-dimer negative  RVP negative   UA: negative  CXR: no acute lung pathology   CT Head: no acute findings  MRI Brain: There is no mass, intracranial hemorrhage, or acute infarction.  MRA Head/Neck: No substantial intracranial arterial stenosis or large vessel occlusion. No hemodynamically significant arterial stenosis in the neck.  EKG: NSR, Hr: 93, no signs of acute ischemia   Received Meclizine 25 mg x1 and 1L NS bolus x1 in the ED  (04 Apr 2022 16:46)      ---  HOSPITAL COURSE/PERTINENT LABS/PROCEDURES PERFORMED/PENDING TESTS:   Pt was admitted on 4/4/22 for generalized weakness and dizziness.  Neuro and Cardio were consulted. CT head, MRI head, and MRA head/neck unrevealing. Pt was tachycardic - D-dimer was negative. CTA chest with no PE, no pneumonia, distal descending thoracic aorta penetrating ulcer, 0.9cm (depth) x 2.0cm. Consider vascular surgery follow-up and CTA chest in 6 months. 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 Correlate with nuclear medicine thyroid scan for confirmation. Subcentimeter pulmonary nodules up to 4mm can be followed on subsequent imaging. TTE was normal. EBV testing consistent with past infection. Other workup unrevealing for life-threatening cause of symptoms. No obvious infectious, anatomic, metabolic causes of symptoms. Pt afebrile, nontoxic appearing. Possibly post-viral/post-infectious from recent pneumonia coupled with dehydration from abx-related diarrhea and decreased PO intake.   Patient is stable for discharge as per primary medical team and consultants.    PT consulted, recommends discharge home.    Patient showed improvement throughout hospitalization. Patient was seen and examined on day of discharge. Patient was medically optimized for discharge with close outpatient follow up.  < from: CT Angio Chest PE Protocol w/ IV Cont (04.04.22 @ 22:12) >    IMPRESSION:    No pulmonary embolus. No pneumonia.    Distal descending thoracic aorta penetrating ulcer, 0.9 cm (depth) x 2.0   cm. Consider vascular surgery follow-up and CTA chest in 6 months.    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 Correlate with nuclear   medicine thyroid scan for confirmation.    Subcentimeter pulmonary nodules up to 4 mm can be followed on subsequent   imaging.    < end of copied text >    < from: MR Angio Neck No Cont (04.04.22 @ 14:01) >    IMPRESSION:  MR BRAIN:  There is no mass, intracranial hemorrhage, or acute infarction.  MRA head:  No substantial intracranial arterial stenosis or large vessel occlusion.  MRA neck:  No hemodynamically significant arterial stenosis in the neck.    < end of copied text >      ---  PATIENT CONDITION:  - stable    ---  CONSULTANTS:   - Neuro - Dr. Babin  - Cardio - Dr. Caballero    ---  TIME SPENT:  I, the attending physician, was physically present for the key portions of the evaluation and management (E/M) service provided. The total amount of time spent reviewing the hospital notes, laboratory values, imaging findings, assessing/counseling the patient, discussing with consultant physicians, social work, nursing staff was 60  minutes

## 2022-04-04 NOTE — H&P ADULT - PROBLEM SELECTOR PLAN 3
Mildly tachycardic on presentation to the ED  Likely due to dehydration vs anxiety   Given worsening lethargy with mild tachycardia will check CTA to r/o PE Mildly tachycardic on presentation to the ED  Likely due to dehydration vs deconditioning   Given worsening lethargy with mild tachycardia will check CTA to r/o PE Mildly tachycardic on presentation to the ED  Likely due to dehydration vs deconditioning   Given worsening lethargy with mild tachycardia will check CTA to r/o PE  Monitor on telemetry

## 2022-04-04 NOTE — H&P ADULT - NEGATIVE NEUROLOGICAL SYMPTOMS
no transient paralysis/no weakness/no paresthesias/no generalized seizures/no syncope/no tremors/no loss of sensation/no difficulty walking/no loss of consciousness/no confusion/no facial palsy

## 2022-04-04 NOTE — H&P ADULT - PROBLEM SELECTOR PLAN 1
Pt presented with worsening weakness, lethargy, decrease PO, weight loss and episode of light-headedness and off-balance  Recently returned from [place] and completed course of abx for pneumonia   Imaging performed in the ED:  MR Head: There is no mass, intracranial hemorrhage, or acute infarction.  MRA head:  No substantial intracranial arterial stenosis or large vessel occlusion.  MRA neck:  No hemodynamically significant arterial stenosis in the neck.  Admit to telemetry   Monitor for arrythmia   Neurology consulted jenny Ring appreciated - check folate, B12, Thyroid panel, ammonia, EBV   PT consult   Cardiology consulted, jenny Chappell appreciated - Check TTE, Troponin, CPK  Start IV NS 75 cc/hr  Nutrition consult Pt presented with worsening weakness, lethargy, decrease PO, weight loss and episode of light-headedness and off-balance  Recently returned from [place] and completed course of abx for pneumonia   Imaging performed in the ED:  MR Head: There is no mass, intracranial hemorrhage, or acute infarction.  MRA head:  No substantial intracranial arterial stenosis or large vessel occlusion.  MRA neck:  No hemodynamically significant arterial stenosis in the neck.  Admit to telemetry   Monitor for arrythmia   Neurology consulted jenny Ring appreciated - check folate, B12, Thyroid panel, ammonia, EBV   PT consult   Cardiology consulted, jenny Chappell appreciated - Check TTE, Troponin, CPK  Start IV NS 75 cc/hr  Nutrition consult  Check orthostatics Pt presented with worsening weakness, lethargy, decrease PO, weight loss and episode of light-headedness and off-balance  Recently returned from [place] and completed course of abx for pneumonia   Imaging performed in the ED:  MR Head: There is no mass, intracranial hemorrhage, or acute infarction.  MRA head:  No substantial intracranial arterial stenosis or large vessel occlusion.  MRA neck:  No hemodynamically significant arterial stenosis in the neck.  No obvious infectious, anatomic, or metabolic cause for symptomology, likely post-infectious from recently treated pneumonia coupled with dehydration from decreased PO/diarrhea   Admit to telemetry   Monitor for arrythmia   Neurology consulted jenny Ring appreciated - check folate, B12, Thyroid panel, ammonia, EBV   PT consult   Cardiology consulted, jenny Chappell appreciated - Check TTE, Troponin, CPK  Start IV NS 75 cc/hr  Nutrition consult  Check orthostatics Pt presented with worsening weakness, lethargy, decrease PO, weight loss and episode of light-headedness and off-balance, Pt came from [place]  after March 2nd week was there for 3 months   Recently returned from [place] and completed course of abx for pneumonia   Imaging performed in the ED:  MR Head: There is no mass, intracranial hemorrhage, or acute infarction.  MRA head:  No substantial intracranial arterial stenosis or large vessel occlusion.  MRA neck:  No hemodynamically significant arterial stenosis in the neck.  No obvious infectious, anatomic, or metabolic cause for symptomology, likely post-infectious from recently treated pneumonia coupled with dehydration from decreased PO/diarrhea   Admit to telemetry   Monitor for arrythmia   Neurology consulted jenny Ring appreciated - check folate, B12, Thyroid panel, ammonia, EBV   PT consult   Cardiology consulted, jenny Chappell appreciated - Check TTE, Troponin, CPK  Start IV NS 75 cc/hr  Nutrition consult  Check orthostatics

## 2022-04-04 NOTE — H&P ADULT - PROBLEM SELECTOR PLAN 4
Chronic, BP controlled at present   Continue home Amlodipine 2.5 mg Qd with hold parameters   Monitor routine hemodynamics

## 2022-04-04 NOTE — DISCHARGE NOTE PROVIDER - NSDCACTIVITY_GEN_ALL_CORE
No restrictions No heavy lifting/straining Bathing allowed/Showering allowed/Walking - Indoors allowed/No heavy lifting/straining

## 2022-04-04 NOTE — ED ADULT TRIAGE NOTE - AS TEMP SITE
Bedside and Verbal shift change report given to [facility] (oncoming nurse) by Rafael Martinez (offgoing nurse). Report included the following information SBAR, Kardex, Procedure Summary, Intake/Output and MAR. oral

## 2022-04-04 NOTE — ED PROVIDER NOTE - CLINICAL SUMMARY MEDICAL DECISION MAKING FREE TEXT BOX
case also discussed with pt's son:  pt is a 65 yo f who completed a course of abx for H.Flu Pneumonia had wbc count at that time greater than 20K, is followed by dr Lisa moore/cc.  2 weeks ago she travelled from [place] and now is dizzy off balance, and has unsteady gait.  per son she was also persistently tachycardic.  pt states that before her pneumonia she was fine with no pmhx  pmd dr Lazaro, [place]  no allergies not a smoker  on eval  pleasant female awake alert nad  heent cor chest normal  abd soft nt nd no hsm  ext normal  neuro nihss=0 gcs=15  skin normal    plan : ct labs covid rvp xray chest orhtostatic vs ekg cardiac monitor iv fluid bolus  will get mra mri ro stroke of post circulation  d-dimer ro pe dvt all plan dw son and pt aware

## 2022-04-04 NOTE — H&P ADULT - HISTORY OF PRESENT ILLNESS
67 yo F with PMH HTN, HLD, mild GEORGINA, pre-diabetes, asthma, vitamin D deficiency presents to the ED with weakness and dizziness. Pt woke up around 7AM with dizziness while getting out of bed. Pt took Tylenol before coming to the ED. Pt recently came from [place] one month ago.      Of note, pt was recently in the ED on 3/30/22 with weakness. Pt was recently treated on 3/24 for a PNA with Augmentin 875 ,g x7 days and Zpack x5 days by Pulm Dr. Roque. Pt's CXR was negative but sputum cx was positive for H. Influenza. Pt's outpatient CBC with significant for neutrophillic leukocytosis WBC: 22, neut 80%. Pt developed diarrhea after completion of abx. Pt still had a persistent productive cough with occasional wheezing.         Denies fever, chills, chest pain, palpitations, SOB, abdominal pain, nausea, vomiting, urinary frequency, urgency, or dysuria, changes in vision, numbness, tingling.  ED Course:   Vitals: BP: 121/82, HR: 105-->97, Temp: 99 F, RR: 17, SpO2: 98% on RA  Labs: no significant findings, d-dimer negative  RVP negative   UA: negative  CXR: no acute lung pathology   CT Head: no acute findings  MRI Brain: There is no mass, intracranial hemorrhage, or acute infarction.  MRA Head/Neck: No substantial intracranial arterial stenosis or large vessel occlusion. No hemodynamically significant arterial stenosis in the neck.  EKG: NSR, Hr: 93, no signs of acute ischemia   Received Meclizine 25 mg x1 and 1L NS bolus x1 in the ED  65 yo F with PMH HTN, HLD, mild GEORGINA, pre-diabetes, asthma, vitamin D deficiency presents to the ED with weakness and lightheadedness. Pt says that for the past week and a half she has been feeling very weak. Has been sleeping for about 15 hours per day which is very unusual for her. This morning she woke up with persistent weakness, lethargy and felt very lightheaded. She said she felt off-balanced which lasted for a few minutes so she came in to the ER for further evaluation. Of note, pt recently came from [facility] about 2-3 weeks ago. Pt was recently treated on 3/24 for a PNA with Augmentin 875 ,g x7 days and Zpack x5 days by Pulm Dr. Roque. Pt's CXR was negative but sputum cx was positive for H. Influenza. Her respiratory symptoms have resolved, no longer has cough or shortness of breath. Other symptoms she reports is b/l ear pain. Saw an ENT dr last week for it who performed hearing tests which were WNL. Prescribed Flonase which significantly relieves those symptoms. She reports diarrhea since starting her abx, last episode of diarrhea was last night she reports. Typically has 1-2 loose stools per day. She admits to decrease PO, loss of appetite and a 6 lb weight loss in a week and a half. Otherwise, she denies vision changes, hearing changes, sore throat, nasal congestion, chest pain, dyspnea, abdominal pain, nausea or vomiting. Denies focal weakness, numbness or tingling. Denies night sweats or fevers.       ED Course:   Vitals: BP: 121/82, HR: 105-->97, Temp: 99 F, RR: 17, SpO2: 98% on RA  Labs: no significant findings, d-dimer negative  RVP negative   UA: negative  CXR: no acute lung pathology   CT Head: no acute findings  MRI Brain: There is no mass, intracranial hemorrhage, or acute infarction.  MRA Head/Neck: No substantial intracranial arterial stenosis or large vessel occlusion. No hemodynamically significant arterial stenosis in the neck.  EKG: NSR, Hr: 93, no signs of acute ischemia   Received Meclizine 25 mg x1 and 1L NS bolus x1 in the ED

## 2022-04-05 ENCOUNTER — TRANSCRIPTION ENCOUNTER (OUTPATIENT)
Age: 67
End: 2022-04-05

## 2022-04-05 VITALS
SYSTOLIC BLOOD PRESSURE: 126 MMHG | HEART RATE: 84 BPM | RESPIRATION RATE: 20 BRPM | DIASTOLIC BLOOD PRESSURE: 84 MMHG | OXYGEN SATURATION: 95 %

## 2022-04-05 LAB
A1C WITH ESTIMATED AVERAGE GLUCOSE RESULT: 6 % — HIGH (ref 4–5.6)
ALBUMIN SERPL ELPH-MCNC: 3 G/DL — LOW (ref 3.3–5)
ALP SERPL-CCNC: 66 U/L — SIGNIFICANT CHANGE UP (ref 40–120)
ALT FLD-CCNC: 19 U/L — SIGNIFICANT CHANGE UP (ref 12–78)
ANION GAP SERPL CALC-SCNC: 7 MMOL/L — SIGNIFICANT CHANGE UP (ref 5–17)
AST SERPL-CCNC: 8 U/L — LOW (ref 15–37)
BASOPHILS # BLD AUTO: 0.08 K/UL — SIGNIFICANT CHANGE UP (ref 0–0.2)
BASOPHILS NFR BLD AUTO: 1.2 % — SIGNIFICANT CHANGE UP (ref 0–2)
BILIRUB SERPL-MCNC: 0.3 MG/DL — SIGNIFICANT CHANGE UP (ref 0.2–1.2)
BUN SERPL-MCNC: 5 MG/DL — LOW (ref 7–23)
CALCIUM SERPL-MCNC: 8.4 MG/DL — LOW (ref 8.5–10.1)
CHLORIDE SERPL-SCNC: 106 MMOL/L — SIGNIFICANT CHANGE UP (ref 96–108)
CHOLEST SERPL-MCNC: 88 MG/DL — SIGNIFICANT CHANGE UP
CO2 SERPL-SCNC: 27 MMOL/L — SIGNIFICANT CHANGE UP (ref 22–31)
CREAT SERPL-MCNC: 0.6 MG/DL — SIGNIFICANT CHANGE UP (ref 0.5–1.3)
CULTURE RESULTS: SIGNIFICANT CHANGE UP
CULTURE RESULTS: SIGNIFICANT CHANGE UP
EBV EA AB SER IA-ACNC: <5 U/ML — SIGNIFICANT CHANGE UP
EBV EA AB TITR SER IF: POSITIVE
EBV EA IGG SER-ACNC: NEGATIVE — SIGNIFICANT CHANGE UP
EBV NA IGG SER IA-ACNC: 259 U/ML — HIGH
EBV PATRN SPEC IB-IMP: SIGNIFICANT CHANGE UP
EBV VCA IGG AVIDITY SER QL IA: POSITIVE
EBV VCA IGM SER IA-ACNC: 27.8 U/ML — HIGH
EBV VCA IGM SER IA-ACNC: <10 U/ML — SIGNIFICANT CHANGE UP
EBV VCA IGM TITR FLD: NEGATIVE — SIGNIFICANT CHANGE UP
EGFR: 98 ML/MIN/1.73M2 — SIGNIFICANT CHANGE UP
EOSINOPHIL # BLD AUTO: 0.29 K/UL — SIGNIFICANT CHANGE UP (ref 0–0.5)
EOSINOPHIL NFR BLD AUTO: 4.4 % — SIGNIFICANT CHANGE UP (ref 0–6)
ESTIMATED AVERAGE GLUCOSE: 126 MG/DL — HIGH (ref 68–114)
FOLATE RBC-MCNC: 923 NG/ML — SIGNIFICANT CHANGE UP (ref 499–1504)
FOLATE SERPL-MCNC: 9.5 NG/ML — SIGNIFICANT CHANGE UP
GLUCOSE SERPL-MCNC: 80 MG/DL — SIGNIFICANT CHANGE UP (ref 70–99)
HCT VFR BLD CALC: 37.1 % — SIGNIFICANT CHANGE UP (ref 34.5–45)
HCV AB S/CO SERPL IA: 0.1 S/CO — SIGNIFICANT CHANGE UP (ref 0–0.99)
HCV AB SERPL-IMP: SIGNIFICANT CHANGE UP
HDLC SERPL-MCNC: 25 MG/DL — LOW
HETEROPH AB TITR SER AGGL: NEGATIVE — SIGNIFICANT CHANGE UP
HGB BLD-MCNC: 12.1 G/DL — SIGNIFICANT CHANGE UP (ref 11.5–15.5)
HIV 1+2 AB+HIV1 P24 AG SERPL QL IA: SIGNIFICANT CHANGE UP
IMM GRANULOCYTES NFR BLD AUTO: 0.3 % — SIGNIFICANT CHANGE UP (ref 0–1.5)
LEGIONELLA AG UR QL: NEGATIVE — SIGNIFICANT CHANGE UP
LIPID PNL WITH DIRECT LDL SERPL: 48 MG/DL — SIGNIFICANT CHANGE UP
LYMPHOCYTES # BLD AUTO: 3.34 K/UL — HIGH (ref 1–3.3)
LYMPHOCYTES # BLD AUTO: 50.2 % — HIGH (ref 13–44)
MAGNESIUM SERPL-MCNC: 2.4 MG/DL — SIGNIFICANT CHANGE UP (ref 1.6–2.6)
MCHC RBC-ENTMCNC: 27.6 PG — SIGNIFICANT CHANGE UP (ref 27–34)
MCHC RBC-ENTMCNC: 32.6 GM/DL — SIGNIFICANT CHANGE UP (ref 32–36)
MCV RBC AUTO: 84.5 FL — SIGNIFICANT CHANGE UP (ref 80–100)
MONOCYTES # BLD AUTO: 0.6 K/UL — SIGNIFICANT CHANGE UP (ref 0–0.9)
MONOCYTES NFR BLD AUTO: 9 % — SIGNIFICANT CHANGE UP (ref 2–14)
NEUTROPHILS # BLD AUTO: 2.32 K/UL — SIGNIFICANT CHANGE UP (ref 1.8–7.4)
NEUTROPHILS NFR BLD AUTO: 34.9 % — LOW (ref 43–77)
NON HDL CHOLESTEROL: 63 MG/DL — SIGNIFICANT CHANGE UP
NRBC # BLD: 0 /100 WBCS — SIGNIFICANT CHANGE UP (ref 0–0)
PHOSPHATE SERPL-MCNC: 3.1 MG/DL — SIGNIFICANT CHANGE UP (ref 2.5–4.5)
PLATELET # BLD AUTO: 296 K/UL — SIGNIFICANT CHANGE UP (ref 150–400)
POTASSIUM SERPL-MCNC: 3.8 MMOL/L — SIGNIFICANT CHANGE UP (ref 3.5–5.3)
POTASSIUM SERPL-SCNC: 3.8 MMOL/L — SIGNIFICANT CHANGE UP (ref 3.5–5.3)
PROT SERPL-MCNC: 6.3 G/DL — SIGNIFICANT CHANGE UP (ref 6–8.3)
RBC # BLD: 4.39 M/UL — SIGNIFICANT CHANGE UP (ref 3.8–5.2)
RBC # FLD: 14.6 % — HIGH (ref 10.3–14.5)
S PNEUM AG UR QL: NEGATIVE — SIGNIFICANT CHANGE UP
SODIUM SERPL-SCNC: 140 MMOL/L — SIGNIFICANT CHANGE UP (ref 135–145)
SPECIMEN SOURCE: SIGNIFICANT CHANGE UP
SPECIMEN SOURCE: SIGNIFICANT CHANGE UP
TRIGL SERPL-MCNC: 73 MG/DL — SIGNIFICANT CHANGE UP
WBC # BLD: 6.65 K/UL — SIGNIFICANT CHANGE UP (ref 3.8–10.5)
WBC # FLD AUTO: 6.65 K/UL — SIGNIFICANT CHANGE UP (ref 3.8–10.5)

## 2022-04-05 PROCEDURE — 80053 COMPREHEN METABOLIC PANEL: CPT

## 2022-04-05 PROCEDURE — 70547 MR ANGIOGRAPHY NECK W/O DYE: CPT | Mod: MA

## 2022-04-05 PROCEDURE — 83735 ASSAY OF MAGNESIUM: CPT

## 2022-04-05 PROCEDURE — 82550 ASSAY OF CK (CPK): CPT

## 2022-04-05 PROCEDURE — 86664 EPSTEIN-BARR NUCLEAR ANTIGEN: CPT

## 2022-04-05 PROCEDURE — 87449 NOS EACH ORGANISM AG IA: CPT

## 2022-04-05 PROCEDURE — 82140 ASSAY OF AMMONIA: CPT

## 2022-04-05 PROCEDURE — 81003 URINALYSIS AUTO W/O SCOPE: CPT

## 2022-04-05 PROCEDURE — 87507 IADNA-DNA/RNA PROBE TQ 12-25: CPT

## 2022-04-05 PROCEDURE — 84480 ASSAY TRIIODOTHYRONINE (T3): CPT

## 2022-04-05 PROCEDURE — 97163 PT EVAL HIGH COMPLEX 45 MIN: CPT

## 2022-04-05 PROCEDURE — 87899 AGENT NOS ASSAY W/OPTIC: CPT

## 2022-04-05 PROCEDURE — 93005 ELECTROCARDIOGRAM TRACING: CPT

## 2022-04-05 PROCEDURE — 80061 LIPID PANEL: CPT

## 2022-04-05 PROCEDURE — 87389 HIV-1 AG W/HIV-1&-2 AB AG IA: CPT

## 2022-04-05 PROCEDURE — 70450 CT HEAD/BRAIN W/O DYE: CPT | Mod: MG

## 2022-04-05 PROCEDURE — 93306 TTE W/DOPPLER COMPLETE: CPT

## 2022-04-05 PROCEDURE — 84443 ASSAY THYROID STIM HORMONE: CPT

## 2022-04-05 PROCEDURE — 82175 ASSAY OF ARSENIC: CPT

## 2022-04-05 PROCEDURE — 83036 HEMOGLOBIN GLYCOSYLATED A1C: CPT

## 2022-04-05 PROCEDURE — 70551 MRI BRAIN STEM W/O DYE: CPT | Mod: MA

## 2022-04-05 PROCEDURE — 82746 ASSAY OF FOLIC ACID SERUM: CPT

## 2022-04-05 PROCEDURE — 70544 MR ANGIOGRAPHY HEAD W/O DYE: CPT | Mod: MA

## 2022-04-05 PROCEDURE — 36415 COLL VENOUS BLD VENIPUNCTURE: CPT

## 2022-04-05 PROCEDURE — 84484 ASSAY OF TROPONIN QUANT: CPT

## 2022-04-05 PROCEDURE — 0225U NFCT DS DNA&RNA 21 SARSCOV2: CPT

## 2022-04-05 PROCEDURE — 85025 COMPLETE CBC W/AUTO DIFF WBC: CPT

## 2022-04-05 PROCEDURE — 82747 ASSAY OF FOLIC ACID RBC: CPT

## 2022-04-05 PROCEDURE — 85379 FIBRIN DEGRADATION QUANT: CPT

## 2022-04-05 PROCEDURE — 71275 CT ANGIOGRAPHY CHEST: CPT

## 2022-04-05 PROCEDURE — 84100 ASSAY OF PHOSPHORUS: CPT

## 2022-04-05 PROCEDURE — 84436 ASSAY OF TOTAL THYROXINE: CPT

## 2022-04-05 PROCEDURE — 93306 TTE W/DOPPLER COMPLETE: CPT | Mod: 26

## 2022-04-05 PROCEDURE — 87086 URINE CULTURE/COLONY COUNT: CPT

## 2022-04-05 PROCEDURE — 86481 TB AG RESPONSE T-CELL SUSP: CPT

## 2022-04-05 PROCEDURE — 86665 EPSTEIN-BARR CAPSID VCA: CPT

## 2022-04-05 PROCEDURE — 99232 SBSQ HOSP IP/OBS MODERATE 35: CPT

## 2022-04-05 PROCEDURE — G1004: CPT

## 2022-04-05 PROCEDURE — 94640 AIRWAY INHALATION TREATMENT: CPT

## 2022-04-05 PROCEDURE — 86803 HEPATITIS C AB TEST: CPT

## 2022-04-05 PROCEDURE — 99285 EMERGENCY DEPT VISIT HI MDM: CPT | Mod: 25

## 2022-04-05 PROCEDURE — 71046 X-RAY EXAM CHEST 2 VIEWS: CPT

## 2022-04-05 PROCEDURE — 86308 HETEROPHILE ANTIBODY SCREEN: CPT

## 2022-04-05 PROCEDURE — 86663 EPSTEIN-BARR ANTIBODY: CPT

## 2022-04-05 PROCEDURE — 82607 VITAMIN B-12: CPT

## 2022-04-05 RX ORDER — METOPROLOL TARTRATE 50 MG
0.5 TABLET ORAL
Qty: 30 | Refills: 0
Start: 2022-04-05 | End: 2022-05-04

## 2022-04-05 RX ORDER — METOPROLOL TARTRATE 50 MG
12.5 TABLET ORAL
Refills: 0 | Status: DISCONTINUED | OUTPATIENT
Start: 2022-04-05 | End: 2022-04-05

## 2022-04-05 RX ADMIN — Medication 650 MILLIGRAM(S): at 04:25

## 2022-04-05 RX ADMIN — BUDESONIDE AND FORMOTEROL FUMARATE DIHYDRATE 2 PUFF(S): 160; 4.5 AEROSOL RESPIRATORY (INHALATION) at 05:05

## 2022-04-05 RX ADMIN — SODIUM CHLORIDE 75 MILLILITER(S): 9 INJECTION INTRAMUSCULAR; INTRAVENOUS; SUBCUTANEOUS at 08:12

## 2022-04-05 RX ADMIN — Medication 12.5 MILLIGRAM(S): at 17:36

## 2022-04-05 RX ADMIN — Medication 2 SPRAY(S): at 05:06

## 2022-04-05 RX ADMIN — BUDESONIDE AND FORMOTEROL FUMARATE DIHYDRATE 2 PUFF(S): 160; 4.5 AEROSOL RESPIRATORY (INHALATION) at 17:37

## 2022-04-05 RX ADMIN — Medication 650 MILLIGRAM(S): at 03:31

## 2022-04-05 RX ADMIN — Medication 650 MILLIGRAM(S): at 12:12

## 2022-04-05 NOTE — DIETITIAN INITIAL EVALUATION ADULT. - PROBLEM SELECTOR PLAN 1
Pt presented with worsening weakness, lethargy, decrease PO, weight loss and episode of light-headedness and off-balance, Pt came from [place]  after March 2nd week was there for 3 months   Recently returned from [place] and completed course of abx for pneumonia   Imaging performed in the ED:  MR Head: There is no mass, intracranial hemorrhage, or acute infarction.  MRA head:  No substantial intracranial arterial stenosis or large vessel occlusion.  MRA neck:  No hemodynamically significant arterial stenosis in the neck.  No obvious infectious, anatomic, or metabolic cause for symptomology, likely post-infectious from recently treated pneumonia coupled with dehydration from decreased PO/diarrhea   Admit to telemetry   Monitor for arrythmia   Neurology consulted jenny Ring appreciated - check folate, B12, Thyroid panel, ammonia, EBV   PT consult   Cardiology consulted, jenny Chappell appreciated - Check TTE, Troponin, CPK  Start IV NS 75 cc/hr  Nutrition consult  Check orthostatics

## 2022-04-05 NOTE — PROGRESS NOTE ADULT - SUBJECTIVE AND OBJECTIVE BOX
[facility] -- Maribeth Swift, Maxine, Neeru, Alicia, Federico Win Goodger  Office # [phone]    Follow Up:    penetrating ulcer  Subjective/Observations:   Patient seen and examined. No complaints of chest pain or shortness of breath. CTangio reveals penetrating ulcer.      REVIEW OF SYSTEMS: All other review of systems is negative unless indicated above  PAST MEDICAL & SURGICAL HISTORY:  HLD (hyperlipidemia)    Asthmatic bronchitis , chronic    HTN (hypertension)    Mild obstructive sleep apnea    Hyperlipidemia    No significant past surgical history      MEDICATIONS  (STANDING):  amLODIPine   Tablet 2.5 milliGRAM(s) Oral daily  atorvastatin 20 milliGRAM(s) Oral at bedtime  budesonide 160 MICROgram(s)/formoterol 4.5 MICROgram(s) Inhaler 2 Puff(s) Inhalation two times a day  enoxaparin Injectable 40 milliGRAM(s) SubCutaneous every 24 hours  fluticasone propionate 50 MICROgram(s)/spray Nasal Spray 2 Spray(s) Both Nostrils <User Schedule>  sodium chloride 0.9%. 1000 milliLiter(s) (75 mL/Hr) IV Continuous <Continuous>    MEDICATIONS  (PRN):  acetaminophen     Tablet .. 650 milliGRAM(s) Oral every 6 hours PRN Temp greater or equal to 38C (100.4F), Mild Pain (1 - 3)  ALBUTerol    90 MICROgram(s) HFA Inhaler 2 Puff(s) Inhalation every 6 hours PRN Shortness of Breath and/or Wheezing  aluminum hydroxide/magnesium hydroxide/simethicone Suspension 30 milliLiter(s) Oral every 4 hours PRN Dyspepsia  melatonin 3 milliGRAM(s) Oral at bedtime PRN Insomnia  ondansetron Injectable 4 milliGRAM(s) IV Push every 8 hours PRN Nausea and/or Vomiting    Allergies    No Known Allergies    Intolerances      Vital Signs Last 24 Hrs  T(C): 36.7 (05 Apr 2022 04:28), Max: 37.1 (04 Apr 2022 20:24)  T(F): 98 (05 Apr 2022 04:28), Max: 98.8 (04 Apr 2022 20:24)  HR: 93 (05 Apr 2022 11:08) (78 - 97)  BP: 134/89 (05 Apr 2022 11:08) (103/67 - 134/89)  BP(mean): --  RR: 16 (05 Apr 2022 04:28) (16 - 16)  SpO2: 97% (05 Apr 2022 11:08) (94% - 98%)  I&O's Summary    04 Apr 2022 07:01  -  05 Apr 2022 07:00  --------------------------------------------------------  IN: 975 mL / OUT: 0 mL / NET: 975 mL        PHYSICAL EXAM:  TELE: sinus  Constitutional: NAD, awake and alert, well-developed  HEENT: Moist Mucous Membranes, Anicteric  Pulmonary: Non-labored, breath sounds are clear bilaterally, No wheezing, rales or rhonchi  Cardiovascular: Regular, S1 and S2, No murmurs, rubs, gallops or clicks  Gastrointestinal: Bowel Sounds present, soft, nontender.   Lymph: No peripheral edema. No lymphadenopathy.  Skin: No visible rashes or ulcers.  Psych:  Mood & affect appropriate  LABS: All Labs Reviewed:                        12.1   6.65  )-----------( 296      ( 05 Apr 2022 08:44 )             37.1                         x      x     )-----------( x        ( 04 Apr 2022 22:56 )             41.3                         13.0   7.19  )-----------( 303      ( 04 Apr 2022 11:41 )             39.5     05 Apr 2022 08:44    140    |  106    |  5      ----------------------------<  80     3.8     |  27     |  0.60   04 Apr 2022 14:38    135    |  101    |  7      ----------------------------<  109    4.3     |  27     |  0.67     Ca    8.4        05 Apr 2022 08:44  Ca    9.4        04 Apr 2022 14:38  Phos  3.1       05 Apr 2022 08:44  Mg     2.4       05 Apr 2022 08:44    TPro  6.3    /  Alb  3.0    /  TBili  0.3    /  DBili  x      /  AST  8      /  ALT  19     /  AlkPhos  66     05 Apr 2022 08:44  TPro  6.9    /  Alb  3.6    /  TBili  0.4    /  DBili  x      /  AST  8      /  ALT  23     /  AlkPhos  71     04 Apr 2022 14:38      CARDIAC MARKERS ( 04 Apr 2022 17:00 )  x     / x     / 38 U/L / x     / x

## 2022-04-05 NOTE — PROGRESS NOTE ADULT - SUBJECTIVE AND OBJECTIVE BOX
Neurology Follow up note    TED [id number]    HPI:  67 yo F with PMH HTN, HLD, mild GOERGINA, pre-diabetes, asthma, vitamin D deficiency presents to the ED with weakness and lightheadedness. Pt says that for the past week and a half she has been feeling very weak. Has been sleeping for about 15 hours per day which is very unusual for her. This morning she woke up with persistent weakness, lethargy and felt very lightheaded. She said she felt off-balanced which lasted for a few minutes so she came in to the ER for further evaluation. Of note, pt recently came from [facility] about 2-3 weeks ago. Pt was recently treated on 3/24 for a PNA with Augmentin 875 ,g x7 days and Zpack x5 days by Pulm Dr. Roque. Pt's CXR was negative but sputum cx was positive for H. Influenza. Her respiratory symptoms have resolved, no longer has cough or shortness of breath. Other symptoms she reports is b/l ear pain. Saw an ENT dr last week for it who performed hearing tests which were WNL. Prescribed Flonase which significantly relieves those symptoms. She reports diarrhea since starting her abx, last episode of diarrhea was last night she reports. Typically has 1-2 loose stools per day. She admits to decrease PO, loss of appetite and a 6 lb weight loss in a week and a half. Otherwise, she denies vision changes, hearing changes, sore throat, nasal congestion, chest pain, dyspnea, abdominal pain, nausea or vomiting. Denies focal weakness, numbness or tingling. Denies night sweats or fevers.       ED Course:   Vitals: BP: 121/82, HR: 105-->97, Temp: 99 F, RR: 17, SpO2: 98% on RA  Labs: no significant findings, d-dimer negative  RVP negative   UA: negative  CXR: no acute lung pathology   CT Head: no acute findings  MRI Brain: There is no mass, intracranial hemorrhage, or acute infarction.  MRA Head/Neck: No substantial intracranial arterial stenosis or large vessel occlusion. No hemodynamically significant arterial stenosis in the neck.  EKG: NSR, Hr: 93, no signs of acute ischemia   Received Meclizine 25 mg x1 and 1L NS bolus x1 in the ED  (2022 16:46)      Interval History -doing better    Patient is seen, chart was reviewed and case was discussed with the treatment team.  Pt is not in any distress.   Lying on bed comfortably.   No events reported overnight.   No clinical seizure was reported.    Vital Signs Last 24 Hrs  T(C): 37.1 (2022 13:32), Max: 37.1 (2022 20:24)  T(F): 98.8 (2022 13:32), Max: 98.8 (2022 20:24)  HR: 84 (2022 17:34) (78 - 93)  BP: 126/84 (2022 17:34) (103/67 - 134/89)  BP(mean): --  RR: 20 (2022 17:34) (16 - 20)  SpO2: 95% (2022 17:34) (94% - 97%)        REVIEW OF SYSTEMS:    Constitutional: No fever, weight loss  Eyes: No eye pain, visual disturbances, or discharge  ENT:  No difficulty hearing, tinnitus, vertigo; No sinus or throat pain  Neck: No pain or stiffness  Respiratory: No  wheezing, chills or hemoptysis  Cardiovascular: No chest pain, palpitations, shortness of breath,   Gastrointestinal: No abdominal or epigastric pain. No nausea, vomiting or hematemesis;   Genitourinary: No dysuria, frequency, hematuria or incontinence  Neurological: No headaches, memory loss, loss of strength,   Psychiatric: No depression, anxiety, mood swings or difficulty sleeping  Musculoskeletal: No joint pain or swelling; No muscle, back or extremity pain  Skin: No itching, burning, rashes or lesions   Lymph Nodes: No enlarged glands  Endocrine: No heat or cold intolerance; No hair loss,   Allergy and Immunologic: No hives or eczema    On Neurological Examination:    Mental Status - Pt is alert, awake, oriented X3. Follows commands well and able to answer questions appropriately.Mood and affect  normal    Speech -  Normal.     Cranial Nerves - Pupils 3 mm equal and reactive to light, extraocular eye movements intact. Pt has no visual field deficit.  Pt has no  facial asymmetry. Facial sensation is intact.Tongue - is in midline.    Muscle tone - is normal    Motor Exam - 5/5 except mild weakness  right ankle, No drift. No shaking or tremors.    Sensory Exam -  Pt withdraws all extremities equally on stimulation. No asymmetry seen. No complaints of tingling, numbness.    Gait - Able to stand and walk unassisted.      coordination:    Finger to nose: normal    Deep tendon Reflexes - 2 plus all over.          Neck Supple -  Yes.     MEDICATIONS    acetaminophen     Tablet .. 650 milliGRAM(s) Oral every 6 hours PRN  ALBUTerol    90 MICROgram(s) HFA Inhaler 2 Puff(s) Inhalation every 6 hours PRN  aluminum hydroxide/magnesium hydroxide/simethicone Suspension 30 milliLiter(s) Oral every 4 hours PRN  amLODIPine   Tablet 2.5 milliGRAM(s) Oral daily  atorvastatin 20 milliGRAM(s) Oral at bedtime  budesonide 160 MICROgram(s)/formoterol 4.5 MICROgram(s) Inhaler 2 Puff(s) Inhalation two times a day  enoxaparin Injectable 40 milliGRAM(s) SubCutaneous every 24 hours  fluticasone propionate 50 MICROgram(s)/spray Nasal Spray 2 Spray(s) Both Nostrils <User Schedule>  melatonin 3 milliGRAM(s) Oral at bedtime PRN  metoprolol tartrate 12.5 milliGRAM(s) Oral two times a day  ondansetron Injectable 4 milliGRAM(s) IV Push every 8 hours PRN  sodium chloride 0.9%. 1000 milliLiter(s) IV Continuous <Continuous>      Allergies    No Known Allergies    Intolerances        LABS:  CBC Full  -  ( 2022 08:44 )  WBC Count : 6.65 K/uL  RBC Count : 4.39 M/uL  Hemoglobin : 12.1 g/dL  Hematocrit : 37.1 %  Platelet Count - Automated : 296 K/uL  Mean Cell Volume : 84.5 fl  Mean Cell Hemoglobin : 27.6 pg  Mean Cell Hemoglobin Concentration : 32.6 gm/dL      Urinalysis Basic - ( 2022 12:08 )    Color: Pale Yellow / Appearance: Clear / S.005 / pH: x  Gluc: x / Ketone: Negative  / Bili: Negative / Urobili: Negative   Blood: x / Protein: Negative / Nitrite: Negative   Leuk Esterase: Negative / RBC: x / WBC x   Sq Epi: x / Non Sq Epi: x / Bacteria: x          140  |  106  |  5<L>  ----------------------------<  80  3.8   |  27  |  0.60    Ca    8.4<L>      2022 08:44  Phos  3.1     04-05  Mg     2.4     04-05    TPro  6.3  /  Alb  3.0<L>  /  TBili  0.3  /  DBili  x   /  AST  8<L>  /  ALT  19  /  AlkPhos  66  04-05    Hemoglobin A1C:   Lipid Panel - @ 12:04  Total Cholesterol, Serum 88  LDL --  Triglycerides 73    Vitamin B12     RADIOLOGY    ASSESSMENT AND PLAN:      seen for weakness/dizziness   brain mri- no acute cva    DC HOME  EMG AS PUT PATIENT FOR RIGHT ANKLE WEAKNESS  Physical therapy evaluation.  OOB to chair/ambulation with assistance only.  Pain is accessed and addressed.  Would continue to follow.

## 2022-04-05 NOTE — DIETITIAN INITIAL EVALUATION ADULT. - OTHER INFO
Reason for Admission: weakness  65 yo F with PMH HTN, HLD, mild GEORGINA, pre-diabetes, asthma, vitamin D deficiency presents to the ED with weakness and lightheadedness. Pt says that for the past week and a half she has been feeling very weak. Has been sleeping for about 15 hours per day which is very unusual for her. This morning she woke up with persistent weakness, lethargy and felt very lightheaded. She said she felt off-balanced which lasted for a few minutes so she came in to the ER for further evaluation. Of note, pt recently came from [facility] about 2-3 weeks ago.   patient reports weight now 130# usual wt ~ 136#  no food allergies avoids eggs   per family in room patient will try ensure enlive van or strawberry flavor

## 2022-04-05 NOTE — CHART NOTE - NSCHARTNOTEFT_GEN_A_CORE
Received sign out from day team to follow up CTA results. Prelim results: No pulmonary embolism. Age indeterminate 1.7 x 0.8 cm focal outpouching from the distal descending thoracic aorta compatible with penetrating ulcer/pseudoaneurysm. Will follow up AM official report.

## 2022-04-05 NOTE — DISCHARGE NOTE NURSING/CASE MANAGEMENT/SOCIAL WORK - NSDCPEFALRISK_GEN_ALL_CORE
For information on Fall & Injury Prevention, visit: [email or web address] OR  [email or web address] OR  https://www.cdc.gov/steadi/patient.html

## 2022-04-05 NOTE — DIETITIAN INITIAL EVALUATION ADULT. - PROBLEM SELECTOR PLAN 3
Mildly tachycardic on presentation to the ED  Likely due to dehydration vs deconditioning   Given worsening lethargy with mild tachycardia will check CTA to r/o PE  Monitor on telemetry

## 2022-04-05 NOTE — PROGRESS NOTE ADULT - NS ATTEND AMEND GEN_ALL_CORE FT
not felt to have an acute cv process contributing to her sxs  ct with penetrating ulcer, bp control and added bb

## 2022-04-05 NOTE — PROGRESS NOTE ADULT - PROBLEM SELECTOR PLAN 6
Continue home inhaler regimen  Symbicort 160-4.5 mcg 2 puffs BID  Albuterol 90 mcg 2 puffs inhaled q6 PRN  Respiratory status stable

## 2022-04-05 NOTE — PROGRESS NOTE ADULT - ASSESSMENT
65 yo F with PMH HTN, HLD, mild GEORGINA, pre-diabetes, asthma, vitamin D deficiency presents to the ED with weakness, decreased PO and lightheadedness. Admit for further evaluation

## 2022-04-05 NOTE — PROGRESS NOTE ADULT - PROBLEM SELECTOR PLAN 4
- Chronic, BP well controlled at present   - Continue home Amlodipine 2.5 mg Qd with hold parameters   - Monitor routine hemodynamics - Chronic, BP well controlled at present   - Continue home Amlodipine 2.5 mg Qd with hold parameters   - started on Lopressor 12.5 mg 2x day

## 2022-04-05 NOTE — PROGRESS NOTE ADULT - PROBLEM SELECTOR PLAN 3
- Mildly tachycardic on presentation to the ED, Now resolved/resolving  - Likely due to dehydration vs deconditioning   - D-dimer negative  - CTA chest prelim read - negative for PE but did show age-indeterminate 1.7 x 0.8cm focal outpouching from distal descending thoracic aorta compatible with penetrating ulcer/pseudoaneurysm. Official radiology report pending - contacted radiology to expedite report.  - admission EKG NSR @ 93bpm  - continue tele monitoring

## 2022-04-05 NOTE — PROGRESS NOTE ADULT - PROBLEM SELECTOR PLAN 2
- Pt with persistent diarrhea after initiating abx   - Last episode of diarrhea night prior to admission   - Will check GI PCR   - Continue IV fluids- NS @ 75 cc/hr

## 2022-04-05 NOTE — PHYSICAL THERAPY INITIAL EVALUATION ADULT - PERTINENT HX OF CURRENT PROBLEM, REHAB EVAL
67 yo F with PMH HTN, HLD, mild GEORGINA, pre-diabetes, asthma, vitamin D deficiency presents to the ED with weakness and lightheadedness. Pt says that for the past week and a half she has been feeling very weak. Has been sleeping for about 15 hours per day which is very unusual for her.

## 2022-04-05 NOTE — PROGRESS NOTE ADULT - PROBLEM SELECTOR PLAN 1
- Pt presented with worsening weakness, lethargy, decrease PO, weight loss and episode of light-headedness and off-balance, Pt came from [place]  after March 2nd week was there for 3 months. Completed outpt course of abx for pna. CXR negative for acute findings  - CT head stable exam with no acute findings.  -MR Head: There is no mass, intracranial hemorrhage, or acute infarction, MRA head: No substantial intracranial arterial stenosis or large vessel occlusion. MRA neck: No hemodynamically significant arterial stenosis in the neck.  - no evidence of CVA as explanation for symptoms. pt now asymptomatic. no obvious anatomic explanation  - pt is afebrile, nontoxic appearing, no leukocytosis. No obvious infectious source identified. UA negative, f/u urine culture  - Neurology Dr. Babin consulted, recs appreciated - f/u B12, folate. TSH wnl, ammonia wnl. EBV panel consistent with past infection. F/u mono serology  - Cardiology Dr. Caballero consulted, recs appreciated - CPK and troponin negative. TTE pending - called echo to expedite study.  - No obvious metabolic cause for symptoms. Possibly post-viral/post-infectious from recent pneumonia coupled with dehydration from abx-related diarrhea and decreased PO intake.  - IV fluids - NS @ 75 cc/hr  - Nutrition eval  - Orthostatics collected - negative for orthostatic hypotension

## 2022-04-05 NOTE — DISCHARGE NOTE NURSING/CASE MANAGEMENT/SOCIAL WORK - PATIENT PORTAL LINK FT
You can access the FollowMyHealth Patient Portal offered by [facility] by registering at the following website: [email or web address]. By joining [facility]’s FollowMyHealth portal, you will also be able to view your health information using other applications (apps) compatible with our system.

## 2022-04-05 NOTE — DIETITIAN INITIAL EVALUATION ADULT. - PHYSCIAL ASSESSMENT
BMI 27.1 based on given ht and wt/overweight/other (specify) no pressure injury  patient appears with no overt muscle/fat loss

## 2022-04-05 NOTE — PROGRESS NOTE ADULT - PROBLEM SELECTOR PLAN 5
- Continue home Lipitor 20 mg qhs  - Check lipid panel - Continue home Lipitor 20 mg qhs  - Check lipid panel-Nl Low HDL

## 2022-04-05 NOTE — PHYSICAL THERAPY INITIAL EVALUATION ADULT - OCCUPATION
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

## 2022-04-05 NOTE — PROGRESS NOTE ADULT - ATTENDING COMMENTS
67 yo F with PMH HTN, HLD, mild GEORGINA, pre-diabetes, asthma, vitamin D deficiency presents to the ED with weakness, decreased PO and lightheadedness. Admit for further evaluation .  Pt seen, Examined, case & care plan d/w pt, residents at detail.  Care plan d/w DR Babin -Neurology -stable  Cardiology - Dr Siddiqi - ECHO grossly normal, pending official read.  PO diet  DVT PPX .   D/W Pt at detail.   -D/C home today.  Total d/c care time is 60 minutes.

## 2022-04-05 NOTE — DIETITIAN INITIAL EVALUATION ADULT. - NUTRITION DIAGNOSTIC #1
AVS reviewed with patient. Verbalized understanding. AVS was printed and given to patient. Prescriptions sent electronically to patients pharmacy of choice.      Marie Villagomez RN  12/03/21 2698 Statement Selected

## 2022-04-05 NOTE — PROGRESS NOTE ADULT - ASSESSMENT
Monica is a 65 yo F with PMH HTN, HLD, mild GEORGINA, pre-diabetes, asthma, vitamin D deficiency presents to the ED with weakness and dizziness. She was recently treated for pneumonia, with a sputum cx positive for H. influenza.    -initiated a low dose lopressor 12.5 bid   - unclear etiology of her generalized weakness/fatigue, though suggest post infectious symptoms, rather than cardiac  - no sign of acute ischemia  - no sign of volume overloaded, and appears dry on exam  - ekg demonstrates a sr without worrisome findings, and her normal rhythm doesn't explain her dizziness.  - can check routine echocardiogram  - cont with amlodipine for htn  - infectious/metabolic work up is in progress  - trend creatinine and electrolytes. Keep K>4, mg>2     Monica is a 65 yo F with PMH HTN, HLD, mild GEORGINA, pre-diabetes, asthma, vitamin D deficiency presents to the ED with weakness and dizziness. She was recently treated for pneumonia, with a sputum cx positive for H. influenza.    -ct revealed a penetrating ulcer, typically treated similar to aneurysm/dissection risks with bp control, bb  -initiated a low dose lopressor 12.5 bid   - unclear etiology of her generalized weakness/fatigue, though suggest post infectious symptoms, rather than cardiac  - no sign of acute ischemia  - no sign of volume overload  - ekg demonstrates a sr without worrisome findings, and her normal rhythm doesn't explain her dizziness.  - can check routine echocardiogram  - cont with amlodipine for htn  - infectious/metabolic work up is in progress  - trend creatinine and electrolytes. Keep K>4, mg>2

## 2022-04-05 NOTE — DIETITIAN INITIAL EVALUATION ADULT. - ORAL INTAKE PTA/DIET HISTORY
patient with reported 1 1/2 week poor PO PTA as she returned from [place] . ~ 6 # weight loss reported  follows ELIDA diet lacto vegeterian  reports and confirmed by CNA and breakfast tray plate waste with good PO now. denies allergies or swallowing/chewing issues

## 2022-04-06 LAB
GAMMA INTERFERON BACKGROUND BLD IA-ACNC: 0.65 IU/ML — SIGNIFICANT CHANGE UP
M TB IFN-G BLD-IMP: POSITIVE
M TB IFN-G CD4+ BCKGRND COR BLD-ACNC: 4.14 IU/ML — SIGNIFICANT CHANGE UP
M TB IFN-G CD4+CD8+ BCKGRND COR BLD-ACNC: 4.97 IU/ML — SIGNIFICANT CHANGE UP
QUANT TB PLUS MITOGEN MINUS NIL: 9.35 IU/ML — SIGNIFICANT CHANGE UP
T3 SERPL-MCNC: 95 NG/DL — SIGNIFICANT CHANGE UP (ref 80–200)
T4 AB SER-ACNC: 7.8 UG/DL — SIGNIFICANT CHANGE UP (ref 4.6–12)
VIT B12 SERPL-MCNC: 1771 PG/ML — HIGH (ref 232–1245)

## 2022-04-07 ENCOUNTER — APPOINTMENT (OUTPATIENT)
Dept: NEUROLOGY | Facility: CLINIC | Age: 67
End: 2022-04-07
Payer: MEDICARE

## 2022-04-07 VITALS
HEART RATE: 82 BPM | BODY MASS INDEX: 30.09 KG/M2 | HEIGHT: 55 IN | SYSTOLIC BLOOD PRESSURE: 128 MMHG | DIASTOLIC BLOOD PRESSURE: 84 MMHG | WEIGHT: 130 LBS

## 2022-04-07 DIAGNOSIS — R26.89 OTHER ABNORMALITIES OF GAIT AND MOBILITY: ICD-10-CM

## 2022-04-07 DIAGNOSIS — H92.02 OTALGIA, LEFT EAR: ICD-10-CM

## 2022-04-07 PROCEDURE — 99204 OFFICE O/P NEW MOD 45 MIN: CPT

## 2022-04-07 NOTE — CONSULT LETTER
[Dear  ___] : Dear  [unfilled], [Consult Letter:] : I had the pleasure of evaluating your patient, [unfilled]. [Please see my note below.] : Please see my note below. [Consult Closing:] : Thank you very much for allowing me to participate in the care of this patient.  If you have any questions, please do not hesitate to contact me. [Sincerely,] : Sincerely, [FreeTextEntry3] : Lorenzo Torres MD\par \par  [DrSimba  ___] : Dr. KEATING

## 2022-04-07 NOTE — HISTORY OF PRESENT ILLNESS
[FreeTextEntry1] : Mrs. Monica Dillard is a 66-year-old right-handed patient who was referred for neurologic evaluation at your kind suggestion.  She was accompanied by her  Nataly.\par \par Mrs. Dillard returned from a 4-month trip initially to [place] in [place] and 3 months in [place].  She returned home on .  Upon her return, she had a severe cough lasting a few weeks.  She was eventually diagnosed with Haemophilus influenza and treated with antibiotics.\par \par On , she experienced severe headache accompanied by nausea and vomiting.  This subsided in 2 days.  This was similar to her typical migraine which occurs about once a year.\par \par Since her headache, she has experienced fatigue, weakness, mild confusion and an unsteady gait.  She had severe left ear pain for about 5 days.  She denies feeling dizzy, vertiginous or experiencing hearing loss or tinnitus.\par \par She was evaluated in your office on .  Audiogram revealed mild bilateral high-frequency hearing loss.\par \par CT of the brain and MRI of the brain and MR angiography without contrast were unremarkable.\par \par Past surgical history notable for 2  sections.  She suffers from hypertension, hyperlipidemia, asthma, thyroid nodules and migraine.  She has no allergies.  Medications include metoprolol, aspirin, albuterol and Symbicort.  She is a non-smoker and nondrinker.  She is  and worked as a .  Family history is notable for a father with coronary disease, a mother with asthma and a brother with heart disease.

## 2022-04-07 NOTE — REVIEW OF SYSTEMS
[Memory Lapses or Loss] : memory loss [Difficulty Walking] : difficulty walking [Negative] : Heme/Lymph

## 2022-04-07 NOTE — PHYSICAL EXAM
[FreeTextEntry1] : Constitutional:  Patient was well-developed, well-nourished and in no acute distress. \par \par Head:  Normocephalic, atraumatic. Tympanic membranes were clear. \par \par Neck:  Supple with full range of motion. \par \par Cardiovascular:  Cardiac rhythm was regular without murmur. There were no carotid bruits. Peripheral pulses were full and symmetric. \par \par Respiratory:  Lungs were clear. \par \par Abdomen:  Soft and nontender. \par \par Spine:  Nontender. \par \par Skin:  There were no rashes. \par \par NEUROLOGICAL EXAMINATION:\par \par Mental Status: Patient was alert and oriented. Speech was fluent. There was no dysarthria.  She recalled 1 of 3 words after several minutes.\par \par Cranial Nerves: \par \par II: Visual acuity was 20/50 in the right eye and 20/20-1 in left eye with glasses and near card. Pupils were equal and reactive. Visual fields were full. Funduscopic examination was normal. \par \par III, IV, VI:  Eye movements were full without nystagmus.  Pursuit was mildly jerky.\par \par V: Facial sensation was intact. \par \par VII: Facial strength was normal. \par \par VIII: Hearing was equal. Nylen Barany maneuver was negative.\par \par IX, X: Palatal movement was normal. Phonation was normal. \par \par XI: Sternocleidomastoids and trapezii were normal. \par \par XII: Tongue was midline and movements normal. There was no lingual atrophy or fasciculations. \par \par Motor Examination: Muscle bulk, tone and strength were normal.  There was no tremor.\par \par Sensory Examination: Pinprick, vibration and joint position sense were intact. \par \par Reflexes: DTRs were 2+ throughout. \par \par Plantar Responses: Plantar responses were flexor. \par \par Coordination/Cerebellar Function: There was no dysmetria on finger to nose or heel to shin testing. \par \par Gait/Stance: Gait was mildly stiff.  Romberg was negative.  Tandem was unsteady.\par

## 2022-04-07 NOTE — ASSESSMENT
[FreeTextEntry1] : Mrs. Dillard is a 66-year-old who suffered a recent bout of Haemophilus influenza treated with antibiotics.  She subsequently developed severe left-sided otalgia, imbalance and mild cognitive slowness.  I carefully reviewed her imaging studies and question whether there might be a mild abnormality of the left petrous apex.  I question whether her otalgia and imbalance are due to cochlear and vestibular disease.  She does not appear to be suffering from stroke or Parkinson's disease.\par \par I suggested that she undergo an MRI of the brain and IACs with and without contrast.  I will request review of her previous imaging studies.  I will communicate with Dr. Diaz regarding my findings and concerns.  If her diagnosis remains uncertain, VNG testing might be considered.  Further management will depend upon her clinical course.

## 2022-04-08 LAB
ARSENIC SERPL-MCNC: 1 UG/L — SIGNIFICANT CHANGE UP (ref 0–9)
CADMIUM SERPL-MCNC: 0.7 UG/L — SIGNIFICANT CHANGE UP (ref 0–1.2)
LEAD BLD-MCNC: 2 UG/DL — SIGNIFICANT CHANGE UP (ref 0–4)
MERCURY SERPL-MCNC: <1 UG/L — SIGNIFICANT CHANGE UP (ref 0–14.9)

## 2022-05-02 ENCOUNTER — OUTPATIENT (OUTPATIENT)
Dept: OUTPATIENT SERVICES | Facility: HOSPITAL | Age: 67
LOS: 1 days | End: 2022-05-02
Payer: MEDICARE

## 2022-05-02 ENCOUNTER — APPOINTMENT (OUTPATIENT)
Dept: MRI IMAGING | Facility: IMAGING CENTER | Age: 67
End: 2022-05-02
Payer: MEDICARE

## 2022-05-02 DIAGNOSIS — H92.02 OTALGIA, LEFT EAR: ICD-10-CM

## 2022-05-02 DIAGNOSIS — R26.89 OTHER ABNORMALITIES OF GAIT AND MOBILITY: ICD-10-CM

## 2022-05-02 PROCEDURE — 70553 MRI BRAIN STEM W/O & W/DYE: CPT

## 2022-05-02 PROCEDURE — A9585: CPT

## 2022-05-02 PROCEDURE — 70553 MRI BRAIN STEM W/O & W/DYE: CPT | Mod: 26

## 2022-05-03 ENCOUNTER — APPOINTMENT (OUTPATIENT)
Dept: VASCULAR SURGERY | Facility: CLINIC | Age: 67
End: 2022-05-03
Payer: MEDICARE

## 2022-05-03 ENCOUNTER — NON-APPOINTMENT (OUTPATIENT)
Age: 67
End: 2022-05-03

## 2022-05-03 VITALS
SYSTOLIC BLOOD PRESSURE: 128 MMHG | HEART RATE: 68 BPM | BODY MASS INDEX: 30.09 KG/M2 | HEIGHT: 55 IN | DIASTOLIC BLOOD PRESSURE: 80 MMHG | WEIGHT: 130 LBS

## 2022-05-03 PROCEDURE — 99204 OFFICE O/P NEW MOD 45 MIN: CPT

## 2022-05-03 NOTE — PHYSICAL EXAM
[JVD] : no jugular venous distention  [Normal Breath Sounds] : Normal breath sounds [Normal Rate and Rhythm] : normal rate and rhythm [2+] : left 2+ [Ankle Swelling (On Exam)] : not present [Varicose Veins Of Lower Extremities] : not present [] : not present [Abdomen Tenderness] : ~T ~M No abdominal tenderness [No Rash or Lesion] : No rash or lesion [Skin Ulcer] : no ulcer [Alert] : alert [Calm] : calm [de-identified] : Appears well

## 2022-05-03 NOTE — HISTORY OF PRESENT ILLNESS
[FreeTextEntry1] : 66-year-old female with a history of prediabetes, hyperlipidemia and asthma presented to [facility] with dizziness.  Patient underwent an extensive work-up which included an abdominal CT scan which they found ulcerated plaque in the distal thoracic aorta.  Patient has no history of back or abdominal pain at this time.  She is here for evaluation.

## 2022-05-03 NOTE — ASSESSMENT
[FreeTextEntry1] : In the office today I took the opportunity to evaluate the patient's CT scan which does show an ulcerated plaque in the distal thoracic aorta.  The ulceration is small.  No evidence of erosion into underlying surrounding tissue.  At this time no intervention is necessary.  Patient should remain on a baby aspirin and anticholesterol medication.  She may follow-up with me as needed. [Arterial/Venous Disease] : arterial/venous disease

## 2022-06-08 DIAGNOSIS — R42 WEAKNESS: ICD-10-CM

## 2022-06-08 DIAGNOSIS — R53.1 WEAKNESS: ICD-10-CM

## 2022-06-08 DIAGNOSIS — R00.0 TACHYCARDIA, UNSPECIFIED: ICD-10-CM

## 2022-06-08 DIAGNOSIS — I10 ESSENTIAL (PRIMARY) HYPERTENSION: ICD-10-CM

## 2022-06-09 ENCOUNTER — APPOINTMENT (OUTPATIENT)
Dept: PULMONOLOGY | Facility: CLINIC | Age: 67
End: 2022-06-09

## 2022-06-14 ENCOUNTER — APPOINTMENT (OUTPATIENT)
Dept: ENDOCRINOLOGY | Facility: CLINIC | Age: 67
End: 2022-06-14

## 2022-06-15 ENCOUNTER — APPOINTMENT (OUTPATIENT)
Dept: ENDOCRINOLOGY | Facility: CLINIC | Age: 67
End: 2022-06-15
Payer: MEDICARE

## 2022-06-15 VITALS
OXYGEN SATURATION: 99 % | HEART RATE: 85 BPM | SYSTOLIC BLOOD PRESSURE: 140 MMHG | HEIGHT: 55 IN | BODY MASS INDEX: 30.55 KG/M2 | WEIGHT: 132 LBS | TEMPERATURE: 97.7 F | DIASTOLIC BLOOD PRESSURE: 88 MMHG

## 2022-06-15 DIAGNOSIS — E04.9 NONTOXIC GOITER, UNSPECIFIED: ICD-10-CM

## 2022-06-15 PROCEDURE — 99204 OFFICE O/P NEW MOD 45 MIN: CPT | Mod: 25

## 2022-06-15 PROCEDURE — 76536 US EXAM OF HEAD AND NECK: CPT

## 2022-06-15 RX ORDER — CHOLECALCIFEROL (VITAMIN D3) 1250 MCG
1.25 MG CAPSULE ORAL
Qty: 8 | Refills: 0 | Status: DISCONTINUED | COMMUNITY
Start: 2022-03-24 | End: 2022-06-15

## 2022-06-15 RX ORDER — AMOXICILLIN AND CLAVULANATE POTASSIUM 875; 125 MG/1; MG/1
875-125 TABLET, COATED ORAL
Qty: 14 | Refills: 0 | Status: DISCONTINUED | COMMUNITY
Start: 2022-03-24 | End: 2022-06-15

## 2022-06-15 RX ORDER — FLUTICASONE PROPIONATE 50 UG/1
50 SPRAY, METERED NASAL DAILY
Qty: 1 | Refills: 10 | Status: DISCONTINUED | COMMUNITY
Start: 2022-03-30 | End: 2022-06-15

## 2022-06-15 RX ORDER — PNV NO.95/FERROUS FUM/FOLIC AC 28MG-0.8MG
TABLET ORAL
Refills: 0 | Status: DISCONTINUED | COMMUNITY
End: 2022-06-15

## 2022-06-15 RX ORDER — METOPROLOL SUCCINATE 25 MG/1
25 TABLET, EXTENDED RELEASE ORAL DAILY
Refills: 0 | Status: ACTIVE | COMMUNITY

## 2022-06-15 RX ORDER — CALCIUM CITRATE/VITAMIN D3 315MG-6.25
TABLET ORAL
Refills: 0 | Status: ACTIVE | COMMUNITY

## 2022-06-15 RX ORDER — ATORVASTATIN CALCIUM 40 MG/1
40 TABLET, FILM COATED ORAL DAILY
Refills: 0 | Status: ACTIVE | COMMUNITY

## 2022-06-15 RX ORDER — AMLODIPINE BESYLATE 2.5 MG/1
2.5 TABLET ORAL DAILY
Refills: 0 | Status: DISCONTINUED | COMMUNITY
End: 2022-06-15

## 2022-06-15 RX ORDER — AZITHROMYCIN 250 MG/1
250 TABLET, FILM COATED ORAL
Qty: 1 | Refills: 0 | Status: DISCONTINUED | COMMUNITY
Start: 2022-03-24 | End: 2022-06-15

## 2022-06-16 ENCOUNTER — APPOINTMENT (OUTPATIENT)
Dept: CARDIOLOGY | Facility: CLINIC | Age: 67
End: 2022-06-16

## 2022-06-17 NOTE — PROCEDURE
[[employer] e 2008 model, 10-12 MHz frequencies] : multiple real time longitudinal and transverse images were obtained using a high resolution ultrasound with a linear transducer, [employer] e 2008 model, 10-12 MHz frequencies. All measurements will be reported as longitudinal x janis-posterior x transverse. [] : a heterogeneous parenchyma [Isoechoic] : isoechoic nodule [Indistinct] : indistinct [Isthmus] : isthmus there is a  [Solid] : solid [Heterogeneous] : heterogenous nodule [Ovoid] : ovoid in shape [Thin] : has a thin halo [Echogenic Foci] : echogenic foci [Peripheral vascularity] : peripheral vascularity [2] : 2 [No abnormal lymph nodes are seen.] : no abnormal lymph nodes are seen [FreeTextEntry1] : 3.85 x 1.26 x 1.29 [FreeTextEntry5] : 2.81 x 0.68 x 0.81 [FreeTextEntry2] : 0.64 [FreeTextEntry3] : 1.11 x 0.68 x 1.09

## 2022-06-17 NOTE — PROCEDURE
[[employer] e 2008 model, 10-12 MHz frequencies] : multiple real time longitudinal and transverse images were obtained using a high resolution ultrasound with a linear transducer, [employer] e 2008 model, 10-12 MHz frequencies. All measurements will be reported as longitudinal x janis-posterior x transverse. [] : a heterogeneous parenchyma [Isoechoic] : isoechoic nodule [Indistinct] : indistinct [Isthmus] : isthmus there is a  [Solid] : solid [Heterogeneous] : heterogenous nodule [Ovoid] : ovoid in shape [Thin] : has a thin halo [Echogenic Foci] : echogenic foci [Peripheral vascularity] : peripheral vascularity [2] : 2 [No abnormal lymph nodes are seen.] : no abnormal lymph nodes are seen [FreeTextEntry1] : 3.85 x 1.26 x 1.29 [FreeTextEntry5] : 2.81 x 0.68 x 0.81 [FreeTextEntry2] : 0.64 [FreeTextEntry3] : 1.11 x 0.68 x 1.09

## 2022-06-17 NOTE — IMPRESSION
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 Two nodules notable on the goitrous isthmus, not meeting criteria for FNA  [FreeTextEntry2] : Follow up in 6 months

## 2022-06-19 PROBLEM — E04.9 GOITER: Status: ACTIVE | Noted: 2022-06-19

## 2022-06-19 NOTE — HISTORY OF PRESENT ILLNESS
[FreeTextEntry1] : 65 yo F with PMH HTN, HLD, mild GEORGINA, pre-diabetes, asthma, vitamin D deficiency\par presents to the ED with weakness, decreased PO and lightheadedness at [facility]. \par CT chest was also ordered for a work up which showed a sub-sternal goiter. \par \par Patient is here for evaluation of the sub-sternal goiter \par No FH of thyroid cancer\par No previous head or neck radiation exposure \par Recent TFTs are wnl \par \par No compressive neck symptoms described

## 2022-06-19 NOTE — HISTORY OF PRESENT ILLNESS
[FreeTextEntry1] : 67 yo F with PMH HTN, HLD, mild GEORGINA, pre-diabetes, asthma, vitamin D deficiency\par presents to the ED with weakness, decreased PO and lightheadedness at [facility]. \par CT chest was also ordered for a work up which showed a sub-sternal goiter. \par \par Patient is here for evaluation of the sub-sternal goiter \par No FH of thyroid cancer\par No previous head or neck radiation exposure \par Recent TFTs are wnl \par \par No compressive neck symptoms described

## 2022-06-19 NOTE — ASSESSMENT
[FreeTextEntry1] : 66 year old female here for evaluation of incidentally discovered substernal goiter. \par \par -Goitrous extension primarily through the Isthmus\par -Notable that there are two nodules in the isthmus that are not meeting FNA critieria at this time\par -Recent TFTs are within normal limits \par -Follow up in 6 months for repeat US

## 2022-06-25 ENCOUNTER — NON-APPOINTMENT (OUTPATIENT)
Age: 67
End: 2022-06-25

## 2022-06-25 NOTE — HISTORY OF PRESENT ILLNESS
[TextBox_4] : Ms. Dillard is a 66-year-old female with a history of asthma, mild GEORGINA (not on therapy), prediabetes and hyperlipidemia who presents for follow-up. She was recently treated in March 2022 for Haemophilus influenzae pneumonia with augmentin and azithromycin. WBC improved from 22 --> 8 with improvement in her cough and dyspnea. She was subsequently hospitalized at [facility] for lethargy, weakness, and light-headedness. Was noted to have some diarrhea likely due to antibiotics. Workup, including head CT, MRI brain, and CTPA unrevealing. She was discharged home and has been doing well since.\par \par CT Chest (April 2022) with no PE, no pneumonia, distal descending thoracic aorta penetrating ulcer, 0.9 cm (depth) x 2.0 cm, and likely substernal extension of thyroid gland along pretracheal region, suboptimally characterized due to streak artifact. Correlate with nuclear medicine thyroid scan for confirmation. Subcentimeter pulmonary nodules up to 4 mm can be followed on subsequent imaging.\par \par She was subsequently seen by vascular surgery and recommended to remain on baby aspirin and statin.\par \par Echo (April 2022) with normal LV and RV systolic function with no significant valvular disease.\par \par Regarding her GEORGINA, recent HSAT in May 2021 with NOELLE 11.4, T90 0.7%, consistent with mild GEORGINA. Given ESS<10 with no excessive daytime sleepiness, she does not require therapy. She was referred to dentistry for OAT given her bothersome snoring.\par \par PFTs (Oct 2020) with mild restriction, low lung volumes, and mildly reduced diffusing capacity. Pending repeat PFTs with BD testing\par \par CXR PA/Lateral (Sept 2020) with clear lungs, no pneumonia or pleural effusion. No pneumothorax. Cardiac silhouette appears normal. No significant change compared to previous CXR in January 2017.\par \par 2D-echo (June 2020) with normal LV size and systolic function. Mild diastolic dysfunction. Normal RV size and systolic function. LA/RA noraml in size. Minimal MR. Mild TR. No pericardial effusion. Normal pulmonary artery, IVC collapsible\par \par Stress echocardiogram (June 2020) normal study with no evidence of inducible ischemia.

## 2022-07-25 ENCOUNTER — APPOINTMENT (OUTPATIENT)
Dept: PULMONOLOGY | Facility: CLINIC | Age: 67
End: 2022-07-25

## 2022-08-03 ENCOUNTER — APPOINTMENT (OUTPATIENT)
Dept: ENDOCRINOLOGY | Facility: CLINIC | Age: 67
End: 2022-08-03

## 2022-09-20 ENCOUNTER — APPOINTMENT (OUTPATIENT)
Dept: NEUROLOGY | Facility: CLINIC | Age: 67
End: 2022-09-20

## 2022-10-18 ENCOUNTER — APPOINTMENT (OUTPATIENT)
Dept: NEUROLOGY | Facility: CLINIC | Age: 67
End: 2022-10-18

## 2022-12-14 ENCOUNTER — APPOINTMENT (OUTPATIENT)
Dept: ENDOCRINOLOGY | Facility: CLINIC | Age: 67
End: 2022-12-14

## 2023-01-18 ENCOUNTER — RX RENEWAL (OUTPATIENT)
Age: 68
End: 2023-01-18

## 2023-01-31 ENCOUNTER — APPOINTMENT (OUTPATIENT)
Dept: PULMONOLOGY | Facility: CLINIC | Age: 68
End: 2023-01-31
Payer: MEDICARE

## 2023-01-31 ENCOUNTER — APPOINTMENT (OUTPATIENT)
Dept: RADIOLOGY | Facility: IMAGING CENTER | Age: 68
End: 2023-01-31

## 2023-01-31 ENCOUNTER — OUTPATIENT (OUTPATIENT)
Dept: OUTPATIENT SERVICES | Facility: HOSPITAL | Age: 68
LOS: 1 days | End: 2023-01-31
Payer: MEDICARE

## 2023-01-31 VITALS
SYSTOLIC BLOOD PRESSURE: 144 MMHG | WEIGHT: 132 LBS | DIASTOLIC BLOOD PRESSURE: 85 MMHG | HEIGHT: 55 IN | BODY MASS INDEX: 30.55 KG/M2 | HEART RATE: 71 BPM | OXYGEN SATURATION: 94 %

## 2023-01-31 DIAGNOSIS — R06.09 OTHER FORMS OF DYSPNEA: ICD-10-CM

## 2023-01-31 DIAGNOSIS — T14.8XXA OTHER INJURY OF UNSPECIFIED BODY REGION, INITIAL ENCOUNTER: ICD-10-CM

## 2023-01-31 PROCEDURE — 73590 X-RAY EXAM OF LOWER LEG: CPT

## 2023-01-31 PROCEDURE — 99214 OFFICE O/P EST MOD 30 MIN: CPT

## 2023-01-31 PROCEDURE — 73590 X-RAY EXAM OF LOWER LEG: CPT | Mod: 26,RT

## 2023-01-31 RX ORDER — AMLODIPINE BESYLATE 2.5 MG/1
2.5 TABLET ORAL
Refills: 0 | Status: ACTIVE | COMMUNITY

## 2023-01-31 RX ORDER — ATORVASTATIN CALCIUM 20 MG/1
20 TABLET, FILM COATED ORAL
Refills: 0 | Status: COMPLETED | COMMUNITY
End: 2023-01-31

## 2023-01-31 RX ORDER — ALBUTEROL SULFATE 90 UG/1
108 (90 BASE) INHALANT RESPIRATORY (INHALATION)
Qty: 1 | Refills: 5 | Status: ACTIVE | COMMUNITY
Start: 1900-01-01 | End: 1900-01-01

## 2023-03-12 PROBLEM — R06.09 DYSPNEA ON EXERTION: Status: ACTIVE | Noted: 2022-03-23

## 2023-03-12 NOTE — HISTORY OF PRESENT ILLNESS
[TextBox_4] : Ms. Dillard is a 67-year-old female with a history of asthma, mild GEORGINA (not on therapy), prediabetes and hyperlipidemia who presents for follow-up. She takes Symbicort 160-4.5 mcg 2 puffs twice daily, rinses after use. She was recently started on montelukast 10 mg at bedtime with noted improvement in dyspnea. She has used the albuterol maybe 2-3 times/week. Denies nocturnal awakening due to cough or dyspnea. No dyspnea at rest or with regular exertion. Develops dyspnea with walking fast. Reports occasional wheezing. Misses her Symbicort about 2-3 times per week. No chest tightness or pain. No cough. She fell last week \par \par Labs in April 2022 with mild peripheral eosinophilia (absolute 220). Labs in March 2022 with normal CMP, negative upper respiratory panel, elevated IgE (333), and 25-OH vitamin D 9.6. Previous CBC in Sept 2020 with peripheral eosinophilia (absolute 435, 6.8%).\par \par Regarding her GEORGINA, recent HSAT in May 2021 with NOELLE 11.4, T90 0.7%, consistent with mild GEORGINA. Given ESS<10 with no excessive daytime sleepiness, she does not require therapy. She was referred to dentistry for OAT given her bothersome snoring.\par \par PFTs (Oct 2020) with mild restriction, low lung volumes, and mildly reduced diffusing capacity. Pending repeat PFTs with BD testing\par \par CXR PA/Lateral (Sept 2020) with clear lungs, no pneumonia or pleural effusion. No pneumothorax. Cardiac silhouette appears normal. No significant change compared to previous CXR in January 2017.\par \par 2D-echo (June 2020) with normal LV size and systolic function. Mild diastolic dysfunction. Normal RV size and systolic function. LA/RA noraml in size. Minimal MR. Mild TR. No pericardial effusion. Normal pulmonary artery, IVC collapsible\par \par Stress echocardiogram (June 2020) normal study with no evidence of inducible ischemia.

## 2023-03-12 NOTE — ASSESSMENT
[FreeTextEntry1] : Ms. Dillard is a 67-year-old female with a history of asthma, mild GEORGINA (not on therapy), prediabetes and hyperlipidemia who presents for follow-up. She is prescribed Symbicort twice daily, but she frequently misses doses. Uses albuterol rescue inhaler about 2-3 times/week. Reports occasional wheezing. Denies nocturnal awakening due to cough or dyspnea. She was recently started on montelukast 10 mg at bedtime with noted improvement in dyspnea. Denies dyspnea at rest or with regular exertion, but has dyspnea with walking fast. No cough. She fell last week with a contusion over the anterior right lower leg.\par \par 1. Anterior right lower leg bone contusion:\par - X-ray of tibia and fibular with no fracture, but with mild soft tissue swelling\par - Warm compresses\par - Acetaminophen prn\par - Increase activity as tolerates\par \par 2. Mild persistent asthma:\par - Recommend adherence with Symbicort 160-4.5 mcg 2 puffs twice daily, rinse after use\par - Montelukast 10 mg at bedtime\par - Albuterol inhaler prn\par - Labs in 2022 with mild peripheral eosinophilia (absolute 220). CMP normal. Upper respiratory panel negative. IgE elevated to 333. 25-OH vitamin D is reduced at 9.6\par - PFTs in Oct 2020 with mild restriction, no BD response, low lung volumes, and mildly reduced diffusing capacity\par - I asked her to repeat PFTs with bronchodilator testing\par \par 3. Vitamin D deficency:\par - Start Vitamin D 50,000 units weekly for 8 weeks, then 1000 IU daily\par \par 4. Dyspnea on exertion:\par - Overall significantly improved\par - FARFAN likely due to asthma, possible exercise-induced component. Also with possible component of deconditioning\par - PFTs Oct 2020 with mild restriction, low lung volumes, and mildly reduced diffusing capacity. Repeat now\par - Lung are clear to auscultation without crackles or wheezing\par - 2D-echo (June 2020) with normal LV size and systolic function, mild diastolic dysfunction, normal RV size and systolic function, LA/RA normal size, minimal MR, mild TR, no pericardial effusion, normal PA, IVC collapsible. Will provide me of recent echo performed with cardiology\par - Stress echocardiogram (June 2020) normal study with no inducible ischemic changes\par - CXR in March 2022 with clear lungs\par - Continue physical activity as tolerates, at least 30 minutes of brisk walking daily\par \par 5. Mild GEORGINA:\par - Pending mandibular advancement oral appliance therapy \par \par 6. HCM:\par - Up to date with Influenza, Pneumococcal, and COVID-19 vaccinations\par \par 7. Follow-up in 3 months or sooner prn

## 2023-03-12 NOTE — PHYSICAL EXAM
[No Acute Distress] : no acute distress [Well Nourished] : well nourished [Well Groomed] : well groomed [Well Developed] : well developed [Normal Oropharynx] : normal oropharynx [Normal Appearance] : normal appearance [Supple] : supple [No Neck Mass] : no neck mass [No JVD] : no jvd [Normal Rate/Rhythm] : normal rate/rhythm [Normal Pulses] : normal pulses [Normal S1, S2] : normal s1, s2 [No Murmurs] : no murmurs [No Resp Distress] : no resp distress [Clear to Auscultation Bilaterally] : clear to auscultation bilaterally [No Abnormalities] : no abnormalities [Benign] : benign [Not Tender] : not tender [Soft] : soft [Normal Gait] : normal gait [No Clubbing] : no clubbing [No Cyanosis] : no cyanosis [FROM] : FROM [Normal Color/ Pigmentation] : normal color/ pigmentation [No Focal Deficits] : no focal deficits [Cranial Nerves Intact] : cranial nerves intact [No Motor Deficits] : no motor deficits [Oriented x3] : oriented x3 [Normal Affect] : normal affect [No Edema] : no edema [TextBox_68] : no wheezing or rales

## 2023-03-14 ENCOUNTER — APPOINTMENT (OUTPATIENT)
Dept: NEUROLOGY | Facility: CLINIC | Age: 68
End: 2023-03-14

## 2023-04-25 ENCOUNTER — APPOINTMENT (OUTPATIENT)
Dept: PULMONOLOGY | Facility: CLINIC | Age: 68
End: 2023-04-25
Payer: MEDICARE

## 2023-04-25 VITALS
DIASTOLIC BLOOD PRESSURE: 88 MMHG | TEMPERATURE: 97.4 F | HEIGHT: 55 IN | SYSTOLIC BLOOD PRESSURE: 139 MMHG | OXYGEN SATURATION: 98 % | RESPIRATION RATE: 17 BRPM | BODY MASS INDEX: 30.09 KG/M2 | HEART RATE: 76 BPM | WEIGHT: 130 LBS

## 2023-04-25 PROCEDURE — 99214 OFFICE O/P EST MOD 30 MIN: CPT

## 2023-04-25 RX ORDER — FLUTICASONE PROPIONATE 50 UG/1
50 SPRAY, METERED NASAL TWICE DAILY
Qty: 1 | Refills: 3 | Status: ACTIVE | COMMUNITY
Start: 2023-04-25 | End: 1900-01-01

## 2023-05-12 NOTE — HISTORY OF PRESENT ILLNESS
[TextBox_4] : Ms. Dillard is a 67-year-old female with a history of asthma, mild GEORGINA (not on therapy), prediabetes and hyperlipidemia who presents for follow-up after recent hospitalization in [place] for asthma exacerbation following RSV infection. Symptoms included fevers, cough, and wheezing. She was visiting her son who has twins in [place] and was exposed to RSV. She was hospitalized at [facility] for 5 days. She was treated with steroids, antibiotics, and nebulizers. She completed a course of prednisone. She was also given Mucinex, which she is no longer taking. She also completed a course of cefuroxime.\par \par She recently traveled to [facility], [facility], and [facility]. She was able to walk all around and do all her normal activities without limitation. They walked 60 miles in 10 days. She feels significantly improved after recent hospitalization for RSV. She reports feeling back to normal with just mild residual cough. She is adherent with her Symbicort 160-4.5 mcg 2 puffs twice daily, rinses after use.  She takes montelukast 10 mg at bedtime and albuterol inhaler as needed. She uses the albuterol inhaler about 2x/day since discharge. Reports occasional nocturnal awakening due to cough or wheezing since her RSV infection. No dyspnea at rest or with regular exertion. Develops dyspnea with walking fast. \par \par Labs in April 2022 with mild peripheral eosinophilia (absolute 220). Labs in March 2022 with normal CMP, negative upper respiratory panel, elevated IgE (333), and 25-OH vitamin D 9.6. Previous CBC in Sept 2020 with peripheral eosinophilia (absolute 435, 6.8%).\par \par Regarding her GEORGINA, recent HSAT in May 2021 with NOELLE 11.4, T90 0.7%, consistent with mild GEORGINA. Given ESS<10 with no excessive daytime sleepiness, she does not require therapy. She was referred to dentistry for OAT given her bothersome snoring.\par \par PFTs (Oct 2020) with mild restriction, low lung volumes, and mildly reduced diffusing capacity. Pending repeat PFTs with BD testing.\par \par CXR PA/Lateral (Sept 2020) with clear lungs, no pneumonia or pleural effusion. No pneumothorax. Cardiac silhouette appears normal. No significant change compared to previous CXR in January 2017.\par \par 2D-echo (June 2020) with normal LV size and systolic function. Mild diastolic dysfunction. Normal RV size and systolic function. LA/RA noraml in size. Minimal MR. Mild TR. No pericardial effusion. Normal pulmonary artery, IVC collapsible\par \par Stress echocardiogram (June 2020) normal study with no evidence of inducible ischemia.

## 2023-05-12 NOTE — ASSESSMENT
[FreeTextEntry1] : Ms. Dillard is a 67-year-old female with a history of asthma, mild GEORGINA (not on therapy), prediabetes and hyperlipidemia who presents for follow-up after recent hospitalization at [facility] ([facility] for asthma exacerbation following RSV infection after exposed to grandchildren in [place]. She was treated with steroids, antibiotics, and nebulizers. She completed a course of prednisone and cefuroxime. She was also given Mucinex, which she is no longer taking. She feels significantly improved after her recent hospitalization.  She reports feeling back to normal with just mild residual cough.  She recently traveled to [facility], [facility], and [facility].  She was able to walk all around and do all her activities without limitation.  She walked 60 miles in 10 days.\par \par 1. Mild Persistent Asthma with recent exacerbation due to RSV:\par - Continue Symbicort 160-4.5 mcg 2 puffs twice daily, rinse after use \par - Montelukast 10 mg at bedtime\par - Albuterol inhaler prn\par - Check complete PFTs with bronchodilator testing and follow-up in July/August to assess for stability of asthma\par - Increase activity as tolerates to improve endurance\par - Labs in 2022 with mild peripheral eosinophilia (absolute 220). CMP normal. Upper respiratory panel negative. IgE elevated to 333. 25-OH vitamin D is reduced at 9.6\par - PFTs in Oct 2020 with mild restriction, no BD response, low lung volumes, and mildly reduced diffusing capacity\par \par 2. Vitamin D deficency:\par - Start Vitamin D 50,000 units weekly for 8 weeks, then start Vitamin D 1000 IU weekly\par \par 3. Dyspnea on exertion:\par - Overall significantly improved\par - FARFAN likely due to asthma, possible exercise-induced component. Also with possible component of deconditioning\par - PFTs Oct 2020 with mild restriction, low lung volumes, and mildly reduced diffusing capacity\par - Lung are clear to auscultation without crackles or wheezing\par - 2D-echo (June 2020) with normal LV size and systolic function, mild diastolic dysfunction, normal RV size and systolic function, LA/RA normal size, minimal MR, mild TR, no pericardial effusion, normal PA, IVC collapsible. Will provide me of recent echo performed with cardiology\par - Stress echocardiogram (June 2020) normal study with no inducible ischemic changes\par - CXR in March 2022 with clear lungs\par - Continue physical activity as tolerates, at least 30 minutes of brisk walking daily\par \par 4. Mild GEORGINA:\par - Denies GEORGINA symptomatology\par - Holding off on mandibular advancement oral appliance therapy at this time\par \par 5. HCM:\par - Up to date with Influenza, Pneumococcal, and COVID-19 vaccinations\par - Follow-up in July/August with PFTs

## 2023-05-12 NOTE — PHYSICAL EXAM
[No Acute Distress] : no acute distress [Well Nourished] : well nourished [Well Groomed] : well groomed [Well Developed] : well developed [Normal Oropharynx] : normal oropharynx [Normal Appearance] : normal appearance [Supple] : supple [No Neck Mass] : no neck mass [No JVD] : no jvd [Normal Rate/Rhythm] : normal rate/rhythm [Normal Pulses] : normal pulses [Normal S1, S2] : normal s1, s2 [No Murmurs] : no murmurs [No Resp Distress] : no resp distress [Clear to Auscultation Bilaterally] : clear to auscultation bilaterally [No Abnormalities] : no abnormalities [Benign] : benign [Not Tender] : not tender [Soft] : soft [Normal Gait] : normal gait [No Clubbing] : no clubbing [No Cyanosis] : no cyanosis [No Edema] : no edema [FROM] : FROM [Normal Color/ Pigmentation] : normal color/ pigmentation [No Focal Deficits] : no focal deficits [Cranial Nerves Intact] : cranial nerves intact [No Motor Deficits] : no motor deficits [Oriented x3] : oriented x3 [Normal Affect] : normal affect [TextBox_68] : no wheezing or rales

## 2023-08-03 DIAGNOSIS — R05.3 CHRONIC COUGH: ICD-10-CM

## 2023-08-04 LAB
HPIV3 RNA SPEC QL NAA+PROBE: DETECTED
RAPID RVP RESULT: DETECTED
SARS-COV-2 RNA PNL RESP NAA+PROBE: NOT DETECTED

## 2023-08-04 RX ORDER — METHYLPREDNISOLONE 4 MG/1
4 TABLET ORAL
Qty: 1 | Refills: 0 | Status: ACTIVE | COMMUNITY
Start: 2023-08-04 | End: 1900-01-01

## 2023-09-25 ENCOUNTER — APPOINTMENT (OUTPATIENT)
Dept: PULMONOLOGY | Facility: CLINIC | Age: 68
End: 2023-09-25
Payer: MEDICARE

## 2023-09-25 VITALS
OXYGEN SATURATION: 97 % | HEIGHT: 55 IN | DIASTOLIC BLOOD PRESSURE: 81 MMHG | HEART RATE: 72 BPM | WEIGHT: 132 LBS | SYSTOLIC BLOOD PRESSURE: 124 MMHG | BODY MASS INDEX: 30.55 KG/M2

## 2023-09-25 DIAGNOSIS — E55.9 VITAMIN D DEFICIENCY, UNSPECIFIED: ICD-10-CM

## 2023-09-25 DIAGNOSIS — J45.30 MILD PERSISTENT ASTHMA, UNCOMPLICATED: ICD-10-CM

## 2023-09-25 DIAGNOSIS — G47.33 OBSTRUCTIVE SLEEP APNEA (ADULT) (PEDIATRIC): ICD-10-CM

## 2023-09-25 PROCEDURE — 94726 PLETHYSMOGRAPHY LUNG VOLUMES: CPT

## 2023-09-25 PROCEDURE — 94060 EVALUATION OF WHEEZING: CPT

## 2023-09-25 PROCEDURE — ZZZZZ: CPT

## 2023-09-25 PROCEDURE — 94729 DIFFUSING CAPACITY: CPT

## 2023-09-25 PROCEDURE — 99214 OFFICE O/P EST MOD 30 MIN: CPT | Mod: 25

## 2023-09-25 RX ORDER — CHOLECALCIFEROL (VITAMIN D3) 1250 MCG
1.25 MG CAPSULE ORAL
Qty: 8 | Refills: 0 | Status: ACTIVE | COMMUNITY
Start: 2023-01-31 | End: 1900-01-01

## 2023-09-25 RX ORDER — GUAIFENESIN AND DEXTROMETHORPHAN HYDROBROMIDE 1200; 60 MG/1; MG/1
60-1200 TABLET, EXTENDED RELEASE ORAL
Qty: 120 | Refills: 1 | Status: COMPLETED | COMMUNITY
Start: 2023-04-25 | End: 2023-09-25

## 2023-12-12 ENCOUNTER — RX RENEWAL (OUTPATIENT)
Age: 68
End: 2023-12-12

## 2023-12-12 RX ORDER — BUDESONIDE AND FORMOTEROL FUMARATE DIHYDRATE 160; 4.5 UG/1; UG/1
160-4.5 AEROSOL RESPIRATORY (INHALATION)
Qty: 1 | Refills: 5 | Status: ACTIVE | COMMUNITY
Start: 2022-03-23 | End: 1900-01-01

## 2024-02-11 ENCOUNTER — EMERGENCY (EMERGENCY)
Facility: HOSPITAL | Age: 69
LOS: 1 days | Discharge: HOME | End: 2024-02-11
Attending: EMERGENCY MEDICINE | Admitting: EMERGENCY MEDICINE
Payer: MEDICARE

## 2024-02-11 VITALS
RESPIRATION RATE: 18 BRPM | OXYGEN SATURATION: 97 % | DIASTOLIC BLOOD PRESSURE: 78 MMHG | SYSTOLIC BLOOD PRESSURE: 137 MMHG | HEART RATE: 84 BPM | WEIGHT: 130.07 LBS | TEMPERATURE: 98 F

## 2024-02-11 LAB
ALBUMIN SERPL ELPH-MCNC: 3.7 G/DL — SIGNIFICANT CHANGE UP (ref 3.3–5)
ALP SERPL-CCNC: 82 U/L — SIGNIFICANT CHANGE UP (ref 40–120)
ALT FLD-CCNC: 25 U/L — SIGNIFICANT CHANGE UP (ref 12–78)
ANION GAP SERPL CALC-SCNC: 4 MMOL/L — LOW (ref 5–17)
AST SERPL-CCNC: 20 U/L — SIGNIFICANT CHANGE UP (ref 15–37)
BASOPHILS # BLD AUTO: 0.05 K/UL — SIGNIFICANT CHANGE UP (ref 0–0.2)
BASOPHILS NFR BLD AUTO: 0.4 % — SIGNIFICANT CHANGE UP (ref 0–2)
BILIRUB SERPL-MCNC: 0.4 MG/DL — SIGNIFICANT CHANGE UP (ref 0.2–1.2)
BUN SERPL-MCNC: 10 MG/DL — SIGNIFICANT CHANGE UP (ref 7–23)
CALCIUM SERPL-MCNC: 9.7 MG/DL — SIGNIFICANT CHANGE UP (ref 8.5–10.1)
CHLORIDE SERPL-SCNC: 108 MMOL/L — SIGNIFICANT CHANGE UP (ref 96–108)
CO2 SERPL-SCNC: 27 MMOL/L — SIGNIFICANT CHANGE UP (ref 22–31)
CREAT SERPL-MCNC: 0.65 MG/DL — SIGNIFICANT CHANGE UP (ref 0.5–1.3)
EGFR: 95 ML/MIN/1.73M2 — SIGNIFICANT CHANGE UP
EOSINOPHIL # BLD AUTO: 0.34 K/UL — SIGNIFICANT CHANGE UP (ref 0–0.5)
EOSINOPHIL NFR BLD AUTO: 2.9 % — SIGNIFICANT CHANGE UP (ref 0–6)
GLUCOSE SERPL-MCNC: 109 MG/DL — HIGH (ref 70–99)
GRAM STN FLD: ABNORMAL
HCT VFR BLD CALC: 41.8 % — SIGNIFICANT CHANGE UP (ref 34.5–45)
HGB BLD-MCNC: 13 G/DL — SIGNIFICANT CHANGE UP (ref 11.5–15.5)
IMM GRANULOCYTES NFR BLD AUTO: 0.3 % — SIGNIFICANT CHANGE UP (ref 0–0.9)
LYMPHOCYTES # BLD AUTO: 3.9 K/UL — HIGH (ref 1–3.3)
LYMPHOCYTES # BLD AUTO: 33.7 % — SIGNIFICANT CHANGE UP (ref 13–44)
MCHC RBC-ENTMCNC: 26.4 PG — LOW (ref 27–34)
MCHC RBC-ENTMCNC: 31.1 GM/DL — LOW (ref 32–36)
MCV RBC AUTO: 85 FL — SIGNIFICANT CHANGE UP (ref 80–100)
MONOCYTES # BLD AUTO: 0.82 K/UL — SIGNIFICANT CHANGE UP (ref 0–0.9)
MONOCYTES NFR BLD AUTO: 7.1 % — SIGNIFICANT CHANGE UP (ref 2–14)
NEUTROPHILS # BLD AUTO: 6.41 K/UL — SIGNIFICANT CHANGE UP (ref 1.8–7.4)
NEUTROPHILS NFR BLD AUTO: 55.6 % — SIGNIFICANT CHANGE UP (ref 43–77)
NRBC # BLD: 0 /100 WBCS — SIGNIFICANT CHANGE UP (ref 0–0)
PLATELET # BLD AUTO: 268 K/UL — SIGNIFICANT CHANGE UP (ref 150–400)
POTASSIUM SERPL-MCNC: 4 MMOL/L — SIGNIFICANT CHANGE UP (ref 3.5–5.3)
POTASSIUM SERPL-SCNC: 4 MMOL/L — SIGNIFICANT CHANGE UP (ref 3.5–5.3)
PROT SERPL-MCNC: 8 G/DL — SIGNIFICANT CHANGE UP (ref 6–8.3)
RAPID RVP RESULT: DETECTED
RBC # BLD: 4.92 M/UL — SIGNIFICANT CHANGE UP (ref 3.8–5.2)
RBC # FLD: 15.1 % — HIGH (ref 10.3–14.5)
RV+EV RNA SPEC QL NAA+PROBE: DETECTED
SARS-COV-2 RNA SPEC QL NAA+PROBE: SIGNIFICANT CHANGE UP
SODIUM SERPL-SCNC: 139 MMOL/L — SIGNIFICANT CHANGE UP (ref 135–145)
SPECIMEN SOURCE: SIGNIFICANT CHANGE UP
WBC # BLD: 11.56 K/UL — HIGH (ref 3.8–10.5)
WBC # FLD AUTO: 11.56 K/UL — HIGH (ref 3.8–10.5)

## 2024-02-11 PROCEDURE — 0225U NFCT DS DNA&RNA 21 SARSCOV2: CPT

## 2024-02-11 PROCEDURE — 99284 EMERGENCY DEPT VISIT MOD MDM: CPT

## 2024-02-11 PROCEDURE — 36415 COLL VENOUS BLD VENIPUNCTURE: CPT

## 2024-02-11 PROCEDURE — 71045 X-RAY EXAM CHEST 1 VIEW: CPT | Mod: 26

## 2024-02-11 PROCEDURE — 87070 CULTURE OTHR SPECIMN AEROBIC: CPT

## 2024-02-11 PROCEDURE — 94640 AIRWAY INHALATION TREATMENT: CPT

## 2024-02-11 PROCEDURE — 87077 CULTURE AEROBIC IDENTIFY: CPT

## 2024-02-11 PROCEDURE — 87186 SC STD MICRODIL/AGAR DIL: CPT

## 2024-02-11 PROCEDURE — 85025 COMPLETE CBC W/AUTO DIFF WBC: CPT

## 2024-02-11 PROCEDURE — 71045 X-RAY EXAM CHEST 1 VIEW: CPT

## 2024-02-11 PROCEDURE — 99283 EMERGENCY DEPT VISIT LOW MDM: CPT | Mod: 25

## 2024-02-11 PROCEDURE — 80053 COMPREHEN METABOLIC PANEL: CPT

## 2024-02-11 RX ORDER — DEXAMETHASONE 0.5 MG/5ML
10 ELIXIR ORAL ONCE
Refills: 0 | Status: COMPLETED | OUTPATIENT
Start: 2024-02-11 | End: 2024-02-11

## 2024-02-11 RX ORDER — IPRATROPIUM/ALBUTEROL SULFATE 18-103MCG
3 AEROSOL WITH ADAPTER (GRAM) INHALATION
Refills: 0 | Status: DISCONTINUED | OUTPATIENT
Start: 2024-02-11 | End: 2024-02-14

## 2024-02-11 RX ADMIN — Medication 600 MILLIGRAM(S): at 12:20

## 2024-02-11 RX ADMIN — Medication 3 MILLILITER(S): at 12:22

## 2024-02-11 RX ADMIN — Medication 10 MILLIGRAM(S): at 12:20

## 2024-02-11 NOTE — ED PROVIDER NOTE - OBJECTIVE STATEMENT
69 yo female with hx htn, hld, asthma c/o cough and sob x 1 week  denies any chest pain  denies fever or chills  reports cough is productive

## 2024-02-11 NOTE — ED ADULT NURSE NOTE - OBJECTIVE STATEMENT
Patient received complaining of productive cough with intermittent SOB x1 week worsening this past friday, states would cough up dark brown phlegm. Patient is AOx4, safety precautions in place, awaiting evaluation

## 2024-02-11 NOTE — ED ADULT NURSE NOTE - NSFALLUNIVINTERV_ED_ALL_ED
This patient was seen on 7/8/22  My role is Foot , Ankle, and Wound Specialist    SUBJECTIVE    Chief Complaint:  Heel pain     Patient ID: Emelina Reyes is a 74 y o  female  Spence Her is here with  Right heel pain for about 2 months without inciting injury  She describes pain in the heel particularly when getting up from rest  She's been stretching, wearing supportive shoes and icing but still is having pain  The following portions of the patient's history were reviewed and updated as appropriate: allergies, current medications, past family history, past medical history, past social history, past surgical history and problem list     Review of Systems   Constitutional: Negative  HENT: Positive for postnasal drip  Respiratory: Negative  Musculoskeletal: Positive for arthralgias, gait problem and myalgias  OBJECTIVE      Wt 83 kg (183 lb)   BMI 34 58 kg/m²     Foot/Ankle Musculoskeletal Exam    General      Neurological: alert      General additional comments:  I note muscle function of the anterior, posterior, evertor and invertor muscle groups of the lower leg are intact and normal  I note ROM of the ankle joint, subtalar joint, Choparts and Lisfranc's joint complexes and metatarsophalangeal joints are WNL without crepitus nor restrictions bilaterally  I note pain on palpation Right heel at the fascial origin  Physical Exam  Vitals and nursing note reviewed  Constitutional:       General: She is not in acute distress  Appearance: Normal appearance  She is not ill-appearing, toxic-appearing or diaphoretic  HENT:      Head: Normocephalic and atraumatic  Pulmonary:      Effort: Pulmonary effort is normal       Breath sounds: Normal breath sounds     Musculoskeletal:      Comments:  I note muscle function of the anterior, posterior, evertor and invertor muscle groups of the lower leg are intact and normal  I note ROM of the ankle joint, subtalar joint, Choparts and Lisfranc's joint complexes and metatarsophalangeal joints are WNL without crepitus nor restrictions bilaterally  I note pain on palpation Right heel at the fascial origin  Skin:     General: Skin is warm  Capillary Refill: Capillary refill takes less than 2 seconds  Neurological:      General: No focal deficit present  Mental Status: She is alert  Psychiatric:         Mood and Affect: Mood normal              ASSESSMENT     Diagnoses and all orders for this visit:    Plantar fasciitis of right foot  -     lidocaine (XYLOCAINE) 1 % injection 1 mL  -     triamcinolone acetonide (KENALOG-40) 40 mg/mL injection 40 mg         Problem List Items Addressed This Visit        Musculoskeletal and Integument    Plantar fasciitis of right foot - Primary    Relevant Medications    lidocaine (XYLOCAINE) 1 % injection 1 mL (Completed)    triamcinolone acetonide (KENALOG-40) 40 mg/mL injection 40 mg (Completed)              PLAN    I recommended continue three times a day ice application to the heel and stretching exercises taught  I recommended continue to abstain from walking without a supportive shoe in the house  I discussed proper shoegear (a cross- type sneaker or workboot that is in good repair)  I recommended an injection of the heel and the patient gave consent  A formal timeout including patient identification, laterality and existing allergies using [facility] protocol was conducted  I injected (after sterile prep of the skin) a 1:1 mixture of 1% Lidocaine and Kenelog 40  The injection was given at the plantar origin of the medial fascial band at the periosteal level  The patient tolerated it well  Bed/Stretcher in lowest position, wheels locked, appropriate side rails in place/Call bell, personal items and telephone in reach/Instruct patient to call for assistance before getting out of bed/chair/stretcher/Non-slip footwear applied when patient is off stretcher/[place] to call system/Physically safe environment - no spills, clutter or unnecessary equipment/Purposeful proactive rounding/Room/bathroom lighting operational, light cord in reach

## 2024-02-11 NOTE — ED ADULT NURSE NOTE - HISTORY OF COVID-19 VACCINATION
"From: Chung Alejo  To: ISHMAEL Arellano  Sent: 2/28/2019 4:01 PM CST  Subject: Referral Request    Gordon Baum-  (FYI: This is [employer], writing on behalf of Tony Yeyo - couldn't get access through my account even though it says I have ""Proxy Access.)    Writing today because we attempted to go straight to Dr. Jody Hendricks and make an apt with him but was told d/t the length of time it's been since Trevon was seen, we would need a referral to be put in first. Yessi Holder through a list of questions so you could decide if you wanted to see him in your office first before deciding if you felt he needed the referral or if you were comfortable just directly putting a referral in. Whatever you decide is fine. Reason for visit request: 2.5-3 month thick, constant post-nasal drainage. -R/t or start post injury? \""No\""  -How would you describe it?: \""Post-nasal drainage down back of throat that's thick without spit. Super hard to hack out. Color varies between white to light or dark green. With blowing nose at times feels like trying to suck out from throat with this weird sensation. \""  -Anything you've tried that helps?: \""Mucinex does help with thinning mucous to get it out. \""  -Do you still take the daily claritin?: \""Yes\""  -Any pain? And if so, rate it on scale of 0-10 with 10 being worst possible.: \""I don't have pain. My throat is just sore every time I'm trying to hack up the drainage. That soreness is a 1.\""  -Denies any change to sleep/sleeping patterns. I, as wife, do feel that depending on his position in bed, his snoring has become noticeably louder over last few months.   -I did suggest trying OTC Flonase to see if it would help - Trevon refused. So that's the info I have for you. Just let us know your thoughts and where you'd like Trevon to go from here. Thanks and have a nice rest of your day. [employer] and BPTc[employer]ita Yeyo.   " Vaccine status unknown

## 2024-02-11 NOTE — ED PROVIDER NOTE - CLINICAL SUMMARY MEDICAL DECISION MAKING FREE TEXT BOX
69 yo female with hx asthma, htn, with cough and congestion x 1 week. not improving despite home remedies  not hypoxic  will check labs, cxr, rvp, nebs and steroids 67 yo female with hx asthma, htn, with cough and congestion x 1 week. not improving despite home remedies  not hypoxic  will check labs, cxr, rvp, nebs and steroids    1400 patient reevaluated, feels better spow 98%, no wheezing  cxr interpreted by me, no infiltrates

## 2024-02-16 LAB
-  CEFTRIAXONE (MENINGITIDIS): SIGNIFICANT CHANGE UP
-  CEFTRIAXONE (NON-MENINGITIDIS): SIGNIFICANT CHANGE UP
-  CLINDAMYCIN: SIGNIFICANT CHANGE UP
-  ERYTHROMYCIN: SIGNIFICANT CHANGE UP
-  ERYTHROMYCIN: SIGNIFICANT CHANGE UP
-  LEVOFLOXACIN: SIGNIFICANT CHANGE UP
-  PENICILLIN (MENINGITIDIS): SIGNIFICANT CHANGE UP
-  PENICILLIN (NON-MENINGITIDIS): SIGNIFICANT CHANGE UP
-  PENICILLIN (ORAL PENICILLIN V): SIGNIFICANT CHANGE UP
-  TETRACYCLINE: SIGNIFICANT CHANGE UP
-  TRIMETHOPRIM/SULFAMETHOXAZOLE: SIGNIFICANT CHANGE UP
-  VANCOMYCIN: SIGNIFICANT CHANGE UP
CULTURE RESULTS: ABNORMAL
METHOD TYPE: SIGNIFICANT CHANGE UP
ORGANISM # SPEC MICROSCOPIC CNT: ABNORMAL
ORGANISM # SPEC MICROSCOPIC CNT: SIGNIFICANT CHANGE UP
SPECIMEN SOURCE: SIGNIFICANT CHANGE UP

## 2024-02-28 ENCOUNTER — RX RENEWAL (OUTPATIENT)
Age: 69
End: 2024-02-28

## 2024-03-21 NOTE — DISCHARGE NOTE NURSING/CASE MANAGEMENT/SOCIAL WORK - HAVE YOU HAD A FIRST COVID-19 BOOSTER?
----- Message from Kaykay Murphy MD sent at 3/20/2024  5:56 PM EDT -----  Pt has OSTEOPENIA--low bone density--not brittle yet  Pt should continue calcium/vitamin d 1000 mg/1000 units in dived doses daily  Pt to eat calcium rich foods  10 min sunexposure when possible  Walk daily 30 min daily     Kaykay Murphy MD Castro Torres, Elena, MA  Hip xray normal  LS spne xray shows DDD -arthritis--does she wants to see ortho ?  
Patient was notified; voiced understanding.  Patient wants to wait on the orthopedic referral until her next appointment.    
No

## 2024-06-06 ENCOUNTER — RX RENEWAL (OUTPATIENT)
Age: 69
End: 2024-06-06

## 2024-06-06 RX ORDER — MONTELUKAST 10 MG/1
10 TABLET, FILM COATED ORAL
Qty: 90 | Refills: 3 | Status: ACTIVE | COMMUNITY
Start: 1900-01-01 | End: 1900-01-01

## 2024-07-01 NOTE — PATIENT PROFILE ADULT - NSPROGENOTHERPROVIDER_GEN_A_NUR
Spoke with pt letting him know that [facility] requested a pill count tomorrow 7/2/24, by /the latest on Wed 7/3/24, pt verbalized understanding   none

## 2024-08-08 NOTE — H&P ADULT - CARDIOVASCULAR SYMPTOMS
[FreeTextEntry1] : HTN, HLD, CAD, ovarian cyst, anx/dep, insomnia [de-identified] : 49 year old woman s/p admission to [facility] with AWMI ans SP PCI x 2 to LAD with resultant moderate to severe LV dysfunction - lv dysfunction recovered on echo, but has subsequent THIAGO to PRox RCA 80%.. Also with hx HTn, HLD, ovarian cyst, anx with depression, insomnia.  s/p Robotic RSO, extensive lysis of adhesions, resection of peritoneal nodule s/p admission 11/22/21 - 11/23/21 with chest pains. Reports that the pain started in the back. she was seen by cardiology - cxr neg, echo with improved EF, diastolic dysfunction, Stress okay. CBC nl, Cr 0.58, A1c 5.6, lft nl, chol 139, trig 93, HDL 47, LDL 73. no palpitations, dyspnea. with occ dizziness attributed to the diuretic. diuretic changed to HCTZ and potassium added. Reports sx improved, but has returned with occ rt and lt medial chest pains. + some pains under the breasts. No current CP. no cough or wheezing. no fevers. Pain triggered/exacerbated by sitting and thinking about the pain. Better with moving around and doing things. + some fatigue.  Followed up with Cardiology - Dr Giang - [phone] meds have been changed since the hospital - on losartan/hctz, potassium, lasix stopped, brilinta was stopped.   completed cardiac rehab. Has been following with Cardiology. s/p admission 3/11-13/23 - on 3/12/23 for cardiac w/u s/p LHC with THIAGO to PRox RCA 80%.Pt to continue with asa and plavix. to continue with statin. diuretic changed to amlodipine for suspected microvascular dz. Reprots wt gain since getting out of the hospital. restarted the diuretic. Has just been started on Praulent.  statin was held.  Still from some body aches from the statin. Has followed up and had echo for lt back pain to the chest continues to have pain from the left upper back to the lt side and anteriorly.  Like a band.  + some relief with advil and lidocaine patch. no relief with tylenol.   Back has been improved.  Has been intermittent, no current pain.  Had accupuncture. Changed to new cardiology.  to get f/u stress  Hx smoking - was cutting down, trying to quit. Had changed to vaping - quit.  continues to abstain - ~ 15 pack years - quit 2022 cut down on the drinking - has been better - reports now rare Has been walking daily. Has been following diet. Taking meds w/o issues.  No cv sx.  no GI sx. no pain, nausea, vomiting diarrhea, constipation.  no noted neuro sx.  Taking melatonin as needed. SAw GI - had cologaurd 8/21 - to get colon if f/u stress neg Reports that she has been having anxiety - Denies depression. Has been improved since she stopped working. did not like the way the ssri made her feel. not taking. Has been taking the benzo as needed - Has been lasting > 1 mo. now going through divorce. continues to be under stress, still plans on using only as needed and cutting down when the stressors improve (divorce and selling her house).  Taking the benzo only as needed.  Has been okay. shoulder has been better. s/p melanoma removal with derm.  notes reviewed.    vaccines - reported as up to date for flu vaccine  Mammo - due in march gyn- up to date. ophtho - due.  colon - to get if stress neg. dyspnea on exertion

## 2024-08-13 NOTE — PATIENT PROFILE ADULT - FALL HARM RISK - PATIENT NEEDS ASSISTANCE
Labs are stable.  Continue your current doses of medications. Keep up the good work.  Thanks.  [facility]  No assistance needed

## 2024-09-04 NOTE — ED PROVIDER NOTE - DOMESTIC TRAVEL HIGH RISK QUESTION
[de-identified] : Constitutional: The patient appears well developed, well nourished. Examination of patients ability to communicate functionally was normal.       Neurologic: Coordination is normal. Alert and oriented to time, place and person. No evidence of mood disorder, calm affect.         RIGHT    HIP/THIGH: Inspection of the hip/thigh is as follows: Inspection shows no swelling, no ecchymosis, no erythema, no rashes, no masses and no enlarged lymph nodes.       Palpation of the hip/thigh is as follows: greater trochanter tenderness. no groin tenderness.       Range of motion of the hip is as follows in degrees:        Flexion: 120    Abduction:  40    External rotation:  40    Internal rotation:  30        Strength testing of the hip/thigh is as follows:       Hip flexion strength:  5/5,    Hip extension strength:  5/5    Hip abduction strength:   5/5     Hip adduction strength:   5/5.       Special tests of the hip/thigh is as follows: pain in bursa with internal rotation and pain bursa with external rotation.       Neurological testing of the hip/thigh is as follows: motor exam 5/5 throughout, light touch intact throughout and no focal motor defecits.       Gait and function is as follows: non-antalgic gait.    
No

## 2024-09-16 ENCOUNTER — APPOINTMENT (OUTPATIENT)
Dept: PULMONOLOGY | Facility: CLINIC | Age: 69
End: 2024-09-16
Payer: MEDICARE

## 2024-09-16 VITALS
TEMPERATURE: 97.5 F | BODY MASS INDEX: 31.43 KG/M2 | DIASTOLIC BLOOD PRESSURE: 77 MMHG | WEIGHT: 135.8 LBS | RESPIRATION RATE: 17 BRPM | SYSTOLIC BLOOD PRESSURE: 128 MMHG | HEIGHT: 55 IN | OXYGEN SATURATION: 96 % | HEART RATE: 97 BPM

## 2024-09-16 DIAGNOSIS — B96.89 ACUTE BRONCHITIS DUE TO OTHER SPECIFIED ORGANISMS: ICD-10-CM

## 2024-09-16 DIAGNOSIS — J20.8 ACUTE BRONCHITIS DUE TO OTHER SPECIFIED ORGANISMS: ICD-10-CM

## 2024-09-16 DIAGNOSIS — J45.909 UNSPECIFIED ASTHMA, UNCOMPLICATED: ICD-10-CM

## 2024-09-16 PROCEDURE — G2211 COMPLEX E/M VISIT ADD ON: CPT

## 2024-09-16 PROCEDURE — 99215 OFFICE O/P EST HI 40 MIN: CPT

## 2024-09-16 RX ORDER — AMOXICILLIN AND CLAVULANATE POTASSIUM 875; 125 MG/1; MG/1
875-125 TABLET, COATED ORAL
Qty: 14 | Refills: 0 | Status: ACTIVE | COMMUNITY
Start: 2024-09-16 | End: 1900-01-01

## 2024-09-16 RX ORDER — ALBUTEROL SULFATE 2.5 MG/3ML
(2.5 MG/3ML) SOLUTION RESPIRATORY (INHALATION) EVERY 6 HOURS
Qty: 1 | Refills: 3 | Status: ACTIVE | COMMUNITY
Start: 2024-09-14 | End: 1900-01-01

## 2024-09-16 RX ORDER — BENZONATATE 200 MG/1
200 CAPSULE ORAL
Qty: 60 | Refills: 1 | Status: ACTIVE | COMMUNITY
Start: 2024-09-16 | End: 1900-01-01

## 2024-09-16 RX ORDER — PREDNISONE 10 MG/1
10 TABLET ORAL
Qty: 30 | Refills: 0 | Status: ACTIVE | COMMUNITY
Start: 2024-09-13 | End: 1900-01-01

## 2024-09-16 RX ORDER — ATOVAQUONE AND PROGUANIL HYDROCHLORIDE 250; 100 MG/1; MG/1
250-100 TABLET, FILM COATED ORAL
Qty: 24 | Refills: 0 | Status: ACTIVE | COMMUNITY
Start: 2024-08-22

## 2024-09-16 RX ORDER — AZITHROMYCIN 500 MG/1
500 TABLET, FILM COATED ORAL
Qty: 6 | Refills: 0 | Status: ACTIVE | COMMUNITY
Start: 2024-08-22

## 2024-09-17 NOTE — HISTORY OF PRESENT ILLNESS
[Never] : never [TextBox_4] : Ms. Dillard is a 68-year-old female with a history of asthma, mild GEORGINA (not on therapy), prediabetes and hyperlipidemia who presents for follow-up. She was recently in the [facility] ED on 9/12-9/14/24 for asthma exacerbation in the setting of stopping taking Symbicort. She also had a Gn[employer] celebration with a large gathering and exposure to incense. Found in the ED to have peripheral eos (up to 690, 8%). In the ED, she was treated with IV methylprednisolone, albuterol-ipratropium nebs, and magnesium sulfate. She was previously in the  ED in Feb 2024 due to entero/rhinovirus with associated Streptococcus pneumoniae in sputum culture. Currently with cough productive of green phlegm. Wheezing is improved. She restarted taking her Symbicort and is adherent with montelukast. Reports frequent throat clearing. Currently completing 5 days of prednisone 50 mg daily post-ED visit.  Labs in Sept 2024 with peripheral eosinophilia (absolute eos 690, 8%). CMP normal. Previous labs in March 2022 with negative upper respiratory panel, elevated IgE (333), and 25-OH vitamin D 9.6.   CXR (Sept 2024) with clear lungs.  PFTs (Sept 2023) with mild restriction, low lung volumes, and mildly reduced diffusing capacity. There is no BD response in FEV1 or FVC, but there is reduced AQD38-12 with BD response suggesting reactive small airways disease consistent with her history of asthma  Regarding her GEORGINA, recent HSAT in May 2021 with NOELLE 11.4, T90 0.7%, consistent with mild GEORGINA. Given ESS<10 with no excessive daytime sleepiness, she does not require therapy. She was referred to dentistry for OAT given her bothersome snoring.  2D-echo (June 2020) with normal LV size and systolic function. Mild diastolic dysfunction. Normal RV size and systolic function. LA/RA noraml in size. Minimal MR. Mild TR. No pericardial effusion. Normal pulmonary artery, IVC collapsible  Stress echocardiogram (June 2020) normal study with no evidence of inducible ischemia.

## 2024-09-17 NOTE — ASSESSMENT
[FreeTextEntry1] : -  Ms. Dillard is a 68-year-old female with a history of asthma, mild GEORGINA (not on therapy), prediabetes and hyperlipidemia who presents for follow-up. She was recently in the [facility] ED on 9/12-9/14/24 for asthma exacerbation in the setting of stopping taking Symbicort. She also had a [employer] celebration with a large gathering and exposure to incense. Found in the ED to have peripheral eos (up to 690, 8%). In the ED, she was treated with IV methylprednisolone, albuterol-ipratropium nebs, and magnesium sulfate. She was previously in the  ED in Feb 2024 due to entero/rhinovirus with associated Streptococcus pneumoniae in sputum culture. Currently with cough productive of green phlegm. Wheezing is improved. She restarted taking her Symbicort and is adherent with montelukast. Reports frequent throat clearing. Currently completing 5 days of prednisone 50 mg daily post-ED visit.  LABS in Sept 2024 with peripheral eosinophilia (absolute eos 690, 8%). CMP normal. Previous labs in March 2022 with negative upper respiratory panel, elevated IgE (333), and 25-OH vitamin D 9.6.  CXR (Sept 2024) with clear lungs.  PFTs (Sept 2023) with mild restriction, low lung volumes, and mildly reduced diffusing capacity. There is no BD response in FEV1 or FVC, but there is reduced CGS33-33 with BD response suggesting reactive small airways disease consistent with her history of asthma  2D-ECHO (June 2020) with normal LV size and systolic function. Mild diastolic dysfunction. Normal RV size and systolic function. LA/RA noraml in size. Minimal MR. Mild TR. No pericardial effusion. Normal pulmonary artery, IVC collapsible  STRESS ECHO (June 2020) normal study with no evidence of inducible ischemia.  # Moderate persistent eosinophilic asthma with recent acute exacerbation: - Complete current prednisone course, has 3 days left of prednisone 50 mg daily - Given cough with green phlegm production, suspect acute bacterial bronchitis. Also has history of Streptococcus pneumoniae bronchitis in Feb 2024 that was not treated. Therefore, will start Augmentin 875 mg twice daily for 7 days - A prednisone taper was provided for patient in preparation for upcoming travel to [place] - Recommend adherence with Symbicort 160-4.5 mcg 2 puffs twice daily, rinse after use  - Montelukast 10 mg at bedtime - Restart fluticasone nasal spray 2 sprays per nostril twice daily  - Albuterol inhaler/nebulizer PRN - If has recurrent exacerbations while on ICS-LABA therapy + montelukast, will consider IL-5 biologic therapy  # Vitamin D deficiency: - Continue vitamin D supplementation  # Dyspnea on exertion: - FARFAN likely due to asthma, possible exercise-induced component. Also with possible component of deconditioning - 2D-echo (June 2020) with normal LV size and systolic function, mild diastolic dysfunction, normal RV size and systolic function, LA/RA normal size, minimal MR, mild TR, no pericardial effusion, normal PA, IVC collapsible - Stress echocardiogram (June 2020) normal study with no inducible ischemic changes - CXR in Sept 2024 with clear lungs - Continue physical activity as tolerates, at least 30 minutes of brisk walking daily  # Mild GEORGINA: - Denies GEORGINA symptomatology - Holding off on mandibular advancement oral appliance therapy at this time  # HCM: - Up to date with Pneumococcal and COVID-19 vaccinations - Will receive influenza vaccine after recovers from current bronchitis - Follow-up in 1-2 months or sooner

## 2024-09-17 NOTE — HISTORY OF PRESENT ILLNESS
[Never] : never [TextBox_4] : Ms. Dillard is a 68-year-old female with a history of asthma, mild GEORGINA (not on therapy), prediabetes and hyperlipidemia who presents for follow-up. She was recently in the [facility] ED on 9/12-9/14/24 for asthma exacerbation in the setting of stopping taking Symbicort. She also had a [employer] celebration with a large gathering and exposure to incense. Found in the ED to have peripheral eos (up to 690, 8%). In the ED, she was treated with IV methylprednisolone, albuterol-ipratropium nebs, and magnesium sulfate. She was previously in the  ED in Feb 2024 due to entero/rhinovirus with associated Streptococcus pneumoniae in sputum culture. Currently with cough productive of green phlegm. Wheezing is improved. She restarted taking her Symbicort and is adherent with montelukast. Reports frequent throat clearing. Currently completing 5 days of prednisone 50 mg daily post-ED visit.  Labs in Sept 2024 with peripheral eosinophilia (absolute eos 690, 8%). CMP normal. Previous labs in March 2022 with negative upper respiratory panel, elevated IgE (333), and 25-OH vitamin D 9.6.   CXR (Sept 2024) with clear lungs.  PFTs (Sept 2023) with mild restriction, low lung volumes, and mildly reduced diffusing capacity. There is no BD response in FEV1 or FVC, but there is reduced [id number] with BD response suggesting reactive small airways disease consistent with her history of asthma  Regarding her GEORGINA, recent HSAT in May 2021 with NOELLE 11.4, T90 0.7%, consistent with mild GEORGINA. Given ESS<10 with no excessive daytime sleepiness, she does not require therapy. She was referred to dentistry for OAT given her bothersome snoring.  2D-echo (June 2020) with normal LV size and systolic function. Mild diastolic dysfunction. Normal RV size and systolic function. LA/RA noraml in size. Minimal MR. Mild TR. No pericardial effusion. Normal pulmonary artery, IVC collapsible  Stress echocardiogram (June 2020) normal study with no evidence of inducible ischemia.

## 2024-09-17 NOTE — PHYSICAL EXAM
[No Acute Distress] : no acute distress [Well Nourished] : well nourished [Well Groomed] : well groomed [Well Developed] : well developed [Normal Oropharynx] : normal oropharynx [Normal Appearance] : normal appearance [Supple] : supple [No Neck Mass] : no neck mass [No JVD] : no jvd [Normal Rate/Rhythm] : normal rate/rhythm [Normal Pulses] : normal pulses [Normal S1, S2] : normal s1, s2 [No Murmurs] : no murmurs [No Resp Distress] : no resp distress [Clear to Auscultation Bilaterally] : clear to auscultation bilaterally [No Abnormalities] : no abnormalities [Benign] : benign [Not Tender] : not tender [Soft] : soft [Normal Gait] : normal gait [No Clubbing] : no clubbing [No Cyanosis] : no cyanosis [No Edema] : no edema [FROM] : FROM [Normal Color/ Pigmentation] : normal color/ pigmentation [No Focal Deficits] : no focal deficits [Cranial Nerves Intact] : cranial nerves intact [No Motor Deficits] : no motor deficits [Oriented x3] : oriented x3 [Normal Affect] : normal affect [Erythema] : erythema [No Acc Muscle Use] : no acc muscle use [TextBox_68] : diffuse end-expiratory wheezing; no rales

## 2024-09-17 NOTE — ASSESSMENT
[FreeTextEntry1] : -  Ms. Dillard is a 68-year-old female with a history of asthma, mild GEORGINA (not on therapy), prediabetes and hyperlipidemia who presents for follow-up. She was recently in the [facility] ED on 9/12-9/14/24 for asthma exacerbation in the setting of stopping taking Symbicort. She also had a [employer] celebration with a large gathering and exposure to incense. Found in the ED to have peripheral eos (up to 690, 8%). In the ED, she was treated with IV methylprednisolone, albuterol-ipratropium nebs, and magnesium sulfate. She was previously in the  ED in Feb 2024 due to entero/rhinovirus with associated Streptococcus pneumoniae in sputum culture. Currently with cough productive of green phlegm. Wheezing is improved. She restarted taking her Symbicort and is adherent with montelukast. Reports frequent throat clearing. Currently completing 5 days of prednisone 50 mg daily post-ED visit.  LABS in Sept 2024 with peripheral eosinophilia (absolute eos 690, 8%). CMP normal. Previous labs in March 2022 with negative upper respiratory panel, elevated IgE (333), and 25-OH vitamin D 9.6.  CXR (Sept 2024) with clear lungs.  PFTs (Sept 2023) with mild restriction, low lung volumes, and mildly reduced diffusing capacity. There is no BD response in FEV1 or FVC, but there is reduced BTA55-40 with BD response suggesting reactive small airways disease consistent with her history of asthma  2D-ECHO (June 2020) with normal LV size and systolic function. Mild diastolic dysfunction. Normal RV size and systolic function. LA/RA noraml in size. Minimal MR. Mild TR. No pericardial effusion. Normal pulmonary artery, IVC collapsible  STRESS ECHO (June 2020) normal study with no evidence of inducible ischemia.  # Moderate persistent eosinophilic asthma with recent acute exacerbation: - Complete current prednisone course, has 3 days left of prednisone 50 mg daily - Given cough with green phlegm production, suspect acute bacterial bronchitis. Also has history of Streptococcus pneumoniae bronchitis in Feb 2024 that was not treated. Therefore, will start Augmentin 875 mg twice daily for 7 days - A prednisone taper was provided for patient in preparation for upcoming travel to [place] - Recommend adherence with Symbicort 160-4.5 mcg 2 puffs twice daily, rinse after use  - Montelukast 10 mg at bedtime - Restart fluticasone nasal spray 2 sprays per nostril twice daily  - Albuterol inhaler/nebulizer PRN - If has recurrent exacerbations while on ICS-LABA therapy + montelukast, will consider IL-5 biologic therapy  # Vitamin D deficiency: - Continue vitamin D supplementation  # Dyspnea on exertion: - FARFAN likely due to asthma, possible exercise-induced component. Also with possible component of deconditioning - 2D-echo (June 2020) with normal LV size and systolic function, mild diastolic dysfunction, normal RV size and systolic function, LA/RA normal size, minimal MR, mild TR, no pericardial effusion, normal PA, IVC collapsible - Stress echocardiogram (June 2020) normal study with no inducible ischemic changes - CXR in Sept 2024 with clear lungs - Continue physical activity as tolerates, at least 30 minutes of brisk walking daily  # Mild GEORGINA: - Denies GEORGINA symptomatology - Holding off on mandibular advancement oral appliance therapy at this time  # HCM: - Up to date with Pneumococcal and COVID-19 vaccinations - Will receive influenza vaccine after recovers from current bronchitis - Follow-up in 1-2 months or sooner

## 2025-01-23 NOTE — ED PROVIDER NOTE - PATIENT PORTAL LINK FT
220
You can access the FollowMyHealth Patient Portal offered by [facility] by registering at the following website: [email or web address]. By joining [facility]’s FollowMyHealth portal, you will also be able to view your health information using other applications (apps) compatible with our system.
